# Patient Record
Sex: FEMALE | Race: WHITE | NOT HISPANIC OR LATINO | Employment: OTHER | URBAN - METROPOLITAN AREA
[De-identification: names, ages, dates, MRNs, and addresses within clinical notes are randomized per-mention and may not be internally consistent; named-entity substitution may affect disease eponyms.]

---

## 2018-05-10 ENCOUNTER — OFFICE VISIT CONVERTED (OUTPATIENT)
Dept: FAMILY MEDICINE CLINIC | Facility: CLINIC | Age: 48
End: 2018-05-10
Attending: NURSE PRACTITIONER

## 2018-06-26 ENCOUNTER — OFFICE VISIT CONVERTED (OUTPATIENT)
Dept: FAMILY MEDICINE CLINIC | Facility: CLINIC | Age: 48
End: 2018-06-26
Attending: NURSE PRACTITIONER

## 2018-06-27 ENCOUNTER — OFFICE VISIT CONVERTED (OUTPATIENT)
Dept: SURGERY | Facility: CLINIC | Age: 48
End: 2018-06-27
Attending: SURGERY

## 2020-01-02 ENCOUNTER — HOSPITAL ENCOUNTER (OUTPATIENT)
Dept: URGENT CARE | Facility: CLINIC | Age: 50
Discharge: HOME | End: 2020-01-02
Attending: EMERGENCY MEDICINE

## 2020-01-20 ENCOUNTER — OFFICE VISIT CONVERTED (OUTPATIENT)
Dept: FAMILY MEDICINE CLINIC | Facility: CLINIC | Age: 50
End: 2020-01-20
Attending: NURSE PRACTITIONER

## 2020-01-20 ENCOUNTER — CONVERSION ENCOUNTER (OUTPATIENT)
Dept: FAMILY MEDICINE CLINIC | Facility: CLINIC | Age: 50
End: 2020-01-20

## 2020-01-31 ENCOUNTER — HOSPITAL ENCOUNTER (OUTPATIENT)
Dept: FAMILY MEDICINE CLINIC | Facility: CLINIC | Age: 50
Discharge: HOME | End: 2020-01-31
Attending: NURSE PRACTITIONER

## 2020-02-10 ENCOUNTER — HOSPITAL ENCOUNTER (OUTPATIENT)
Dept: FAMILY MEDICINE CLINIC | Facility: CLINIC | Age: 50
Discharge: HOME | End: 2020-02-10
Attending: NURSE PRACTITIONER

## 2020-02-10 LAB
APPEARANCE UR: ABNORMAL
BILIRUB UR QL: NEGATIVE
C TRACH RRNA CVX QL NAA+PROBE: NOT DETECTED
COLOR UR: YELLOW
CONV BACTERIA: NEGATIVE
CONV COLLECTION SOURCE (UA): ABNORMAL
CONV UROBILINOGEN IN URINE BY AUTOMATED TEST STRIP: 0.2 {EHRLICHU}/DL (ref 0.1–1)
GLUCOSE UR QL: NEGATIVE MG/DL
HGB UR QL STRIP: NEGATIVE
KETONES UR QL STRIP: NEGATIVE MG/DL
LEUKOCYTE ESTERASE UR QL STRIP: NEGATIVE
N GONORRHOEA DNA SPEC QL NAA+PROBE: NOT DETECTED
NITRITE UR QL STRIP: NEGATIVE
PH UR STRIP.AUTO: 6.5 [PH] (ref 5–8)
PROT UR QL: NEGATIVE MG/DL
RBC #/AREA URNS HPF: ABNORMAL /[HPF]
SP GR UR: 1.01 (ref 1–1.03)
SQUAMOUS SPT QL MICRO: ABNORMAL /[HPF]
WBC #/AREA URNS HPF: ABNORMAL /[HPF]

## 2020-09-15 ENCOUNTER — HOSPITAL ENCOUNTER (OUTPATIENT)
Dept: OTHER | Facility: HOSPITAL | Age: 50
Discharge: HOME | End: 2020-09-15

## 2020-09-15 LAB
ALBUMIN SERPL-MCNC: 4.3 G/DL (ref 3.5–5)
ALBUMIN/GLOB SERPL: 1.3 {RATIO} (ref 1.4–2.6)
ALP SERPL-CCNC: 102 U/L (ref 42–98)
ALT SERPL-CCNC: 12 U/L (ref 10–40)
ANION GAP SERPL CALC-SCNC: 17 MMOL/L (ref 8–19)
AST SERPL-CCNC: 18 U/L (ref 15–50)
BASOPHILS # BLD AUTO: 0.06 10*3/UL (ref 0–0.2)
BASOPHILS NFR BLD AUTO: 0.8 % (ref 0–3)
BILIRUB SERPL-MCNC: 0.31 MG/DL (ref 0.2–1.3)
BUN SERPL-MCNC: 8 MG/DL (ref 5–25)
BUN/CREAT SERPL: 10 {RATIO} (ref 6–20)
CALCIUM SERPL-MCNC: 9.9 MG/DL (ref 8.7–10.4)
CHLORIDE SERPL-SCNC: 103 MMOL/L (ref 99–111)
CHOLEST SERPL-MCNC: 151 MG/DL (ref 107–200)
CHOLEST/HDLC SERPL: 3.2 {RATIO} (ref 3–6)
CONV ABS IMM GRAN: 0.02 10*3/UL (ref 0–0.2)
CONV CO2: 25 MMOL/L (ref 22–32)
CONV IMMATURE GRAN: 0.3 % (ref 0–1.8)
CONV TOTAL PROTEIN: 7.7 G/DL (ref 6.3–8.2)
CREAT UR-MCNC: 0.84 MG/DL (ref 0.5–0.9)
DEPRECATED RDW RBC AUTO: 42.3 FL (ref 36.4–46.3)
EOSINOPHIL # BLD AUTO: 0 % (ref 0–7)
EOSINOPHIL # BLD AUTO: 0 10*3/UL (ref 0–0.7)
ERYTHROCYTE [DISTWIDTH] IN BLOOD BY AUTOMATED COUNT: 13.5 % (ref 11.7–14.4)
EST. AVERAGE GLUCOSE BLD GHB EST-MCNC: 103 MG/DL
GFR SERPLBLD BASED ON 1.73 SQ M-ARVRAT: >60 ML/MIN/{1.73_M2}
GLOBULIN UR ELPH-MCNC: 3.4 G/DL (ref 2–3.5)
GLUCOSE SERPL-MCNC: 101 MG/DL (ref 65–99)
HBA1C MFR BLD: 5.2 % (ref 3.5–5.7)
HCT VFR BLD AUTO: 48.2 % (ref 37–47)
HDLC SERPL-MCNC: 47 MG/DL (ref 40–60)
HGB BLD-MCNC: 15.5 G/DL (ref 12–16)
LDLC SERPL CALC-MCNC: 88 MG/DL (ref 70–100)
LYMPHOCYTES # BLD AUTO: 1.36 10*3/UL (ref 1–5)
LYMPHOCYTES NFR BLD AUTO: 18.2 % (ref 20–45)
MCH RBC QN AUTO: 27.2 PG (ref 27–31)
MCHC RBC AUTO-ENTMCNC: 32.2 G/DL (ref 33–37)
MCV RBC AUTO: 84.7 FL (ref 81–99)
MONOCYTES # BLD AUTO: 0.49 10*3/UL (ref 0.2–1.2)
MONOCYTES NFR BLD AUTO: 6.6 % (ref 3–10)
NEUTROPHILS # BLD AUTO: 5.53 10*3/UL (ref 2–8)
NEUTROPHILS NFR BLD AUTO: 74.1 % (ref 30–85)
NRBC CBCN: 0 % (ref 0–0.7)
OSMOLALITY SERPL CALC.SUM OF ELEC: 290 MOSM/KG (ref 273–304)
PLATELET # BLD AUTO: 227 10*3/UL (ref 130–400)
PMV BLD AUTO: 11.7 FL (ref 9.4–12.3)
POTASSIUM SERPL-SCNC: 4.1 MMOL/L (ref 3.5–5.3)
RBC # BLD AUTO: 5.69 10*6/UL (ref 4.2–5.4)
SODIUM SERPL-SCNC: 141 MMOL/L (ref 135–147)
TRIGL SERPL-MCNC: 78 MG/DL (ref 40–150)
TSH SERPL-ACNC: 1.05 M[IU]/L (ref 0.27–4.2)
VLDLC SERPL-MCNC: 16 MG/DL (ref 5–37)
WBC # BLD AUTO: 7.46 10*3/UL (ref 4.8–10.8)

## 2020-09-16 LAB — CONV THYROXINE TOTAL: 8.6 UG/DL (ref 4.5–12)

## 2020-09-21 LAB — T3REVERSE SERPL-MCNC: 19.4 NG/DL (ref 9.2–24.1)

## 2020-10-12 ENCOUNTER — CONVERSION ENCOUNTER (OUTPATIENT)
Dept: FAMILY MEDICINE CLINIC | Facility: CLINIC | Age: 50
End: 2020-10-12

## 2020-10-12 ENCOUNTER — TELEMEDICINE CONVERTED (OUTPATIENT)
Dept: FAMILY MEDICINE CLINIC | Facility: CLINIC | Age: 50
End: 2020-10-12
Attending: NURSE PRACTITIONER

## 2020-11-09 ENCOUNTER — CONVERSION ENCOUNTER (OUTPATIENT)
Dept: GASTROENTEROLOGY | Facility: CLINIC | Age: 50
End: 2020-11-09
Attending: INTERNAL MEDICINE

## 2020-12-07 ENCOUNTER — HOSPITAL ENCOUNTER (OUTPATIENT)
Dept: URGENT CARE | Facility: CLINIC | Age: 50
Discharge: HOME | End: 2020-12-07
Attending: PHYSICIAN ASSISTANT

## 2020-12-28 ENCOUNTER — HOSPITAL ENCOUNTER (OUTPATIENT)
Dept: URGENT CARE | Facility: CLINIC | Age: 50
Discharge: HOME | End: 2020-12-28
Attending: FAMILY MEDICINE

## 2021-01-13 ENCOUNTER — CONVERSION ENCOUNTER (OUTPATIENT)
Dept: ORTHOPEDIC SURGERY | Facility: CLINIC | Age: 51
End: 2021-01-13

## 2021-01-13 ENCOUNTER — OFFICE VISIT CONVERTED (OUTPATIENT)
Dept: ORTHOPEDIC SURGERY | Facility: CLINIC | Age: 51
End: 2021-01-13
Attending: STUDENT IN AN ORGANIZED HEALTH CARE EDUCATION/TRAINING PROGRAM

## 2021-04-22 ENCOUNTER — HOSPITAL ENCOUNTER (OUTPATIENT)
Dept: VACCINE CLINIC | Facility: HOSPITAL | Age: 51
Discharge: HOME | End: 2021-04-22
Attending: INTERNAL MEDICINE

## 2021-05-11 NOTE — H&P
History and Physical      Patient Name: Sonia Lima   Patient ID: [id number]   Sex: Female   YOB: 1970    Primary Care Provider: Tova CEDENO   Referring Provider: Tova CEDENO    Visit Date: January 13, 2021    Provider: Carlos Jc MD   Location: [facility]   Location Address: [address] [place] [address]  [id number]   Location Phone: [phone]          Chief Complaint  · left ankle pain   · right big toe      History Of Present Illness  Sonia Lima is a 50 year old /White female who presents today to [facility].      Sonia presents to the office today for evaluation of her left ankle. She reports that she sustained a mechanical fall at her house with an inversion injury to her left ankle and also jammed her right foot on the date of 12/28/2020. She felt immediate pain. [facility] [place] [facility] after. X-rays were obtained and revealed a nondisplaced fracture of her right big toe. The x-ray of her left ankle results were negative for acute fractures. She was placed in a fracture boot and arlen taping over her first two toes. She presented to the [facility] [place], operated by [facility] ED one week later due to persistent pain, and repeat x rays were again negative. She follows up at the office for further evaluation. She states that her right toe is feeling fine today. She removed the arlen tape after 2 days due to feeling uncomfortable and resulting in spasms of her toes. She states that she has been wearing regular shoe wear with her right foot. She has been wearing the fracture boot for her left ankle full time, ambulating on her heel. She states that the pain is located over the anterior lateral aspect of her ankle.  She takes the boot off at night and applies an ace bandage occasionally, depending on the level of pain. She has been taking 800mg ibuprofen for pain control, tolerating it well.  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 She is taking care of her  who has a rare form of lymphoma. She is non-diabetic. She smokes half pack/day. She is otherwise overall healthy.                    Past Medical History  Broken Bones; Depression; Dupuytren's contracture of right hand; Family history of colon cancer; Hot flashes; Mood swings; Reflux; Reflux Disease; Ringing in ears; Seasonal allergies; Snoring; Tobacco Abuse, Chronic         Past Surgical History  Cesarian Section; EGD; Eye Implant; Hysterectomy; Salpingectomy; Wrist         Medication List  amitriptyline 25 mg oral tablet; omeprazole 20 mg oral capsule,delayed release(DR/EC); sertraline 100 mg oral tablet         Allergy List  PENICILLINS       Allergies Reconciled  Family Medical History  Heart Disease; Cancer, Unspecified; Diabetes, unspecified type; Family history of colon cancer; Family history of breast cancer; Family history of rectal cancer; Osteoporosis; Family history of Arthritis; Family history of uterine cancer; Family history of colonic polyps         Reproductive History   4 Para 3 0 0 0       Social History  Alcohol (Current some day); Alcohol Use (Never); lives with other; lives with parents; lives with spouse; ; .; Recreational Drug Use (Never); Tobacco (Current every day); Working         Immunizations  Name Date Admin   Influenza 10/12/2020   Influenza Refused   Tdap 2018         Review of Systems  · Constitutional  o Denies  o : fever, chills, weight loss  · Cardiovascular  o Denies  o : chest pain, shortness of breath  · Gastrointestinal  o Denies  o : liver disease, heartburn, nausea, blood in stools  · Genitourinary  o Denies  o : painful urination, blood in urine  · Integument  o Denies  o : rash, itching  · Neurologic  o Denies  o : headache, weakness, loss of consciousness  · Musculoskeletal  o Admits  o : left ankle pain, right big toe pain  · Psychiatric  o Denies  o : drug/alcohol addiction,  "anxiety, depression      Vitals  Date Time BP Position Site L\R Cuff Size HR RR TEMP (F) WT  HT  BMI kg/m2 BSA m2 O2 Sat FR L/min FiO2 HC       01/13/2021 08:13 AM      90 - R   218lbs 6oz 5'  3\" 38.68 2.1 97 %  21%          Physical Examination  · Constitutional  o Appearance  o : well developed, well-nourished, no obvious deformities present  · Head and Face  o Head  o :   § Inspection  § : normocephalic  o Face  o :   § Inspection  § : no facial lesions  · Eyes  o Conjunctivae  o : conjunctivae normal  o Sclerae  o : sclerae white  · Ears, Nose, Mouth and Throat  o Ears  o :   § External Ears  § : appearance within normal limits  § Hearing  § : intact  o Nose  o :   § External Nose  § : appearance normal  · Neck  o Inspection/Palpation  o : normal appearance  o Range of Motion  o : full range of motion  · Respiratory  o Respiratory Effort  o : breathing unlabored  o Inspection of Chest  o : normal appearance  o Auscultation of Lungs  o : no audible wheezing or rales  · Cardiovascular  o Heart  o : regular rate  · Gastrointestinal  o Abdominal Examination  o : soft and non-tender  · Skin and Subcutaneous Tissue  o General Inspection  o : intact, no rashes  · Psychiatric  o General  o : Alert and oriented x3  o Judgement and Insight  o : judgment and insight intact  o Mood and Affect  o : mood normal, affect appropriate  · Right Ankle/Foot  o Inspection  o : No swelling. No wounds. Tender over the dorsal aspect of distal phalanx of first big toe. Limited toe flexion and extension at the IP joint secondary to pain. Demonstrates active ankle dorsiflexion and plantarflexion without any deficit. Sensation to light touch intact over the dorsal and planter foot. Palpable DP pulse.  · Left Ankle/Foot  o Inspection  o : Moderate swelling over the lateral ankle with resolving bruising extending down into the toes. Sever tenderness over the anterior lateral ligaments and distal lateral malleolus. Non tender over the peroneal " tendons. Nontender over the medial malleolus. Nontender over the deltoid ligament. Nontender over the remaining midfoot, hindfoot or forefoot. No pain with syndesmotic squeeze. Nontender over the achilles tendon. Demonstrates active ankle dorsiflexion, plantarflexion, eversion and inversion limited by pain. Sensations to light touch intact over the dorsal and planter foot. Palpable DP. Wiggles toes pain-free.           Assessment  · Left ankle pain     719.47/M25.572  · Left ankle sprain     845.00/S93.409A  · Great toe fracture, right     826.0/S92.911A      Plan  · Orders  o Tobacco cessation counseling completed (3556F) - - 01/14/2021  · Medications  o Medications have been Reconciled  o Transition of Care or Provider Policy  · Instructions  o X-rays reviewed by Dr. Jc.  o Reviewed the patient's Past Medical, Social, and Family history as well as the ROS at today's visit, no changes.  o Call or return if worsening symptoms.  o Follow Up in 2 weeks.  o The above service was scribed by Jean-Claude Zaldivar on my behalf and I attest to the accuracy of the note. cb  o Sonia has a nondisplaced fracture of her right great toe distal phalanx and a left ankle sprain. We discussed treatment options. We discussed symptomatic measures for the right great toe. She is able to ambulate in normal shoes without pain at this time and may continue to do so. She will continue the fracture boot for the left ankle. We will get her started with physical therapy working on range of motion, peroneal strengthening, and proprioception. She should continue to ice, elevate, take anti-inflammatory medications, and use a compressive wrap as needed. I will see her back in 2 weeks to monitor her progress. We will transition to a ankle brace, likely at that time.            Electronically Signed by: Carlos Jc MD -Author on January 15, 2021 02:17:13 PM

## 2021-05-11 NOTE — H&P
History and Physical      Patient Name: Sonia Mendoza   Patient ID: [id number]   Sex: Female   YOB: 1970    Primary Care Provider: Tova CEDENO   Referring Provider: Tova CEDENO    Visit Date: November 9, 2020    Provider: Luis Jo MD   Location: [facility] Address: [address] [place] [address], [address] [place] [address]  [id number]   Location Phone: [phone]          Chief Complaint  · Surgical History and Physical  · Screening Colonoscopy  · Direct access/screening phone call was made to the patient to update health history and schedule endoscopy      History Of Present Illness  NON-INPATIENT HISTORY AND PHYSICAL  Allergies: PENICILLINS   Chief Complaint/History of Present Illness: Screening Colonoscopy   Colon Recall: No   Failed Outpatient Treatment/Contraindications: N/A   Current Medications: amitriptyline 25 mg oral tablet, omeprazole 20 mg oral capsule,delayed release(DR/EC), sertraline 100 mg oral tablet, and Suprep Bowel Prep Kit 17.5-3.13-1.6 gram oral recon soln   Significant Past Medical History: Broken Bones, Depression, Dupuytren's contracture of right hand, Family history of colon cancer, Hot flashes, Mood swings, Reflux Disease, Ringing in ears, Snoring, and Tobacco Abuse, Chronic   Significant Family Medical History: family history of colon cancer, family history of rectal cancer, family history of colon polyps, family history of uterine cancer   Significant Past Surgical History: EGD, Hysterectomy, Salpingectomy, and Wrist   Previous Colonoscopy: No   Previous EGD: Yes YEAR: 2003 By Whom: [place]   PHYSICAL EXAM:  Heart: Regular Rate and Rhythm   Lungs: Breathing Unlabored       Past Medical History  Disease Name Date Onset Notes   Broken Bones --  --    Depression --  --    Dupuytren's contracture of right hand 10/12/2020 --    Family history of colon cancer 10/12/2020 --    Hot flashes 07/21/2014 07/21/2014    Mood  swings --  --    Reflux Disease --  --    Ringing in ears --  --    Snoring 2014    Tobacco Abuse, Chronic 2014          Past Surgical History  Procedure Name Date Notes   EGD  [place]   Hysterectomy 2016 --    Salpingectomy 2016 --    Wrist 1993 rt gangloin cyst         Medication List  Name Date Started Instructions   amitriptyline 25 mg oral tablet  take 1 tablet (25 mg) by oral route once daily at bedtime   omeprazole 20 mg oral capsule,delayed release(DR/EC) 05/10/2018 take 1 capsule (20 mg) by oral route once daily before a meal   sertraline 100 mg oral tablet  take 1 tablet (100 mg) by oral route once daily   Suprep Bowel Prep Kit 17.5-3.13-1.6 gram oral recon soln 2020 please follow instructions per provider         Allergy List  Allergen Name Date Reaction Notes   PENICILLINS --  rash , itchy ,swelling --        Allergies Reconciled  Family Medical History  Disease Name Relative/Age Notes   Heart Disease Aunt/  Father/  Grandfather (paternal)/  Grandmother (paternal)/  Uncle/   --    Diabetes, unspecified type Brother/  Sister/   --    Family history of colon cancer Brother/   --    Family history of breast cancer Mother/   --    Family history of rectal cancer Brother/   --    Family history of uterine cancer Grandmother (maternal)/   --    Family history of colonic polyps Mother/   --          Reproductive History  Menstrual   Last Menstrual Period: 10/31/2014   Pregnancy Summary   Total Pregnancies: 4 Full Term: 3 Premature: 0   Ab Induced: 0 Ab Spontaneous: 0 Ectopics: 0   Multiples: 0 Livin         Social History  Finding Status Start/Stop Quantity Notes   Alcohol Current some day --/-- 1-2 month --     --  --/-- --  --    Tobacco Current every day --/-- 1ppd 2016 - Stopped smokeing for three weeks          Immunizations  NameDate Admin Mfg Trade Name Lot Number Route Inj VIS Given VIS Publication   Occumzinx00/2020 [employer] FLUARIX  [id number] IM  10/12/2020 08/15/2019   Comments: PT TOLERATED INJ WELL, LEFT OFFICE STABLE   Tdap06/26/2018 [employer] BOOSTRIX [id number] RD 06/26/2018 02/24/2015   Comments: TOLERATED WELL, LEFT IN STABLE CONDITION             Assessment  · Preoperative examination     V72.84/Z01.818  · Screening for colon cancer     V76.51/Z12.11    Problems Reconciled  Plan  · Orders  o Consent for Colonoscopy Screening -Possible risk/complications, benefits, and alternatives to surgical or invasive procedure have been explained to patient and/or legal gaurdian. -Patient has been evaluated and can tolerate anethesia and/or sedation. Risk, benefits, and alternatives to anesthesia and sedation have been explained to patient or legal gaurdian. () - V72.84/Z01.818, V76.51/Z12.11 - 01/08/2021  · Medications  o Medications have been Reconciled  o Transition of Care or Provider Policy  · Instructions  o ****Surgical Orders****  o ***************  o Outpatient  o ***************  o RISK AND BENEFITS:  o Possible risks/complications, benefits and alternatives to surgical or invasive procedure have been explained to the patient and/or legal guardian.  o Patient has been evaluated and can tolerate anesthesia and/or sedation. Risks, benefits, and alternatives to anesthesia and sedation have been explained to the patient and/or legal guardian.  o ***************  o PREP: Per protocol  o IV: Per Anesthesia  o The above History and Physical Examination has been completed within 30 days of admission.  o This note has been transcribed by REAL Hendricks. I have read and agree with the findings in this note.            Electronically Signed by: Mariela Hendricks MA -Author on November 9, 2020 10:01:56 AM

## 2021-05-13 ENCOUNTER — HOSPITAL ENCOUNTER (OUTPATIENT)
Dept: VACCINE CLINIC | Facility: HOSPITAL | Age: 51
Discharge: HOME | End: 2021-05-13
Attending: INTERNAL MEDICINE

## 2021-05-13 ENCOUNTER — HOSPITAL ENCOUNTER (OUTPATIENT)
Dept: MAMMOGRAPHY | Facility: HOSPITAL | Age: 51
Discharge: HOME | End: 2021-05-13
Attending: NURSE PRACTITIONER

## 2021-05-14 VITALS — WEIGHT: 218.37 LBS | HEART RATE: 90 BPM | BODY MASS INDEX: 38.69 KG/M2 | HEIGHT: 63 IN | OXYGEN SATURATION: 97 %

## 2021-05-14 VITALS
OXYGEN SATURATION: 98 % | DIASTOLIC BLOOD PRESSURE: 80 MMHG | HEIGHT: 62 IN | SYSTOLIC BLOOD PRESSURE: 128 MMHG | TEMPERATURE: 96.7 F | WEIGHT: 218.5 LBS | HEART RATE: 95 BPM | BODY MASS INDEX: 40.21 KG/M2

## 2021-05-14 NOTE — PROGRESS NOTES
"   Progress Note      Patient Name: Sonia Mendoza   Patient ID: [id number]   Sex: Female   YOB: 1970    Primary Care Provider: Tova CEDENO   Referring Provider: Tova CEDENO    Visit Date: October 12, 2020    Provider: PAO Cormier   Location: [facility]   Location Address: [address] [place] [address], [address] [place] [address]  [id number]   Location Phone: [phone]          Chief Complaint  · trigger finger pain - right hand      History Of Present Illness  Video Conferencing Visit  Sonia Mendoza is a 50 year old /White female who is presenting for evaluation via video conferencing via Zoom. Verbal consent obtained before beginning visit.   The following staff were present during this visit: Alba Smith CMA and PAO Delarosa.   Provider spent 30 minutes with patient during telehealth visit.   Past Medical History/Overview of Patient Symptoms     She is here for an acute visit today via telehealth.  She is complaining of right middle finger \"trigger finger\" walking on her and she cannot open her finger without manually pulling it upward.  She states that it is not painful but now she is having problems with her adjoining index finger swelling and tender at times.  She does have a history of right shoulder pain with limited range of motion.    She is wanting an order for colonoscopy.  She states that she has had 2 brothers diagnosed with colorectal cancer.  She states both her brothers are much older than her, 18 and 24 years older than her.    Her last mammogram was in 2014.    She is a current smoker, Smokes about 1 pack/day.  She was going to quit using Chantix back in January but states that she had some family issues come up at that time so she decided not to start that at that time.  She does follow with psychiatric care and is currently on sertraline 100 mg and amitriptyline 25 mg daily.  She states she did pick " "up the prescription for the Chantix and does have it at home to start when she is ready.       Past Medical History  Broken Bones; Depression; Hot flashes; Mood swings; Reflux Disease; Ringing in ears; Snoring; Tobacco Abuse, Chronic         Past Surgical History  Hysterectomy; Salpingectomy; Wrist         Medication List  amitriptyline 25 mg oral tablet; Chantix Continuing Month Box 1 mg oral tablet; Chantix Starting Month Box 0.5 mg (11)- 1 mg (42) oral tablets,dose pack; omeprazole 20 mg oral capsule,delayed release(DR/EC); sertraline 100 mg oral tablet         Allergy List  PENICILLINS       Allergies Reconciled  Family Medical History  Heart Disease; Diabetes, unspecified type         Reproductive History   4 Para 3 0 0 0       Social History  Alcohol (Current some day); ; Tobacco (Current every day)         Immunizations  Name Date Admin   Influenza 10/12/2020   Influenza Refused   Tdap 2018         Review of Systems  · Constitutional  o Denies  o : fever, fatigue, weight loss, weight gain  · Cardiovascular  o Denies  o : lower extremity edema, claudication, chest pressure, palpitations  · Respiratory  o Denies  o : shortness of breath, wheezing, cough, hemoptysis, dyspnea on exertion  · Gastrointestinal  o Admits  o : constipation  o Denies  o : nausea, vomiting, diarrhea, abdominal pain, blood in stools, melena  · Integument  o Denies  o : rash, itching  · Musculoskeletal  o Admits  o : limitation of motion, shoulder pain, trigger finger  o Denies  o : joint pain, joint swelling  · Psychiatric  o Denies  o : anxiety, depression, suicidal ideation, homicidal ideation      Vitals  Date Time BP Position Site L\R Cuff Size HR RR TEMP (F) WT  HT  BMI kg/m2 BSA m2 O2 Sat FR L/min FiO2 HC       10/12/2020 09:07 /80 Sitting    95 - R  96.7 218lbs 8oz 5'  2\" 39.96 2.08 98 %            Physical Examination  · Constitutional  o Appearance  o : no acute distress, well-nourished  · Head and " Face  o Head  o :   § Inspection  § : atraumatic, normocephalic  · Respiratory  o Respiratory Effort  o : breathing comfortably, symmetric chest rise  · Neurologic  o Mental Status Examination  o :   § Orientation  § : grossly oriented to person, place and time  · Psychiatric  o General  o : normal mood and affect  · Right Hand  o Inspection  o : Right middle finger locked in a flexed position          Assessment  · Need for influenza vaccination     V04.81/Z23  · Screening for colon cancer     V76.51/Z12.11  · Visit for screening mammogram     V76.12/Z12.31  · Dupuytren's contracture of right hand     728.6/M72.0  · Family history of colon cancer     V16.0/Z80.0      Plan  · Orders  o Immunization Admin Fee (Single) ([facility]) (86603) - V04.81/Z23 - 10/12/2020  o Fluarix QUADRIVALENT Vaccine, Syringe, Latex Free, Preservative Free, age 3+ (02702) - V04.81/Z23 - 10/12/2020   Vaccine - Influenza; Dose: 0.5; Site: Left Upper Arm; Route: Intramuscular; Date: 10/12/2020 09:43:00; Exp: 06/30/2021; Lot: [id number]; Mfg: [employer]; TradeName: FLUARIX; Administered By: Alba Smith CMA; Comment: PT TOLERATED INJ WELL, LEFT OFFICE STABLE  o COLONOSCOPY REFERRAL (COLON) - V76.51/Z12.11, V16.0/Z80.0 - 10/12/2020  o Screening Mammography; Bilateral 3D (68541, 02461, ) - V76.12/Z12.31 - 10/12/2020  o ACO-39: Current medications updated and reviewed (1159F, ) - - 10/12/2020  o ACO-14: Influenza immunization administered or previously received [facility] () - - 10/12/2020  o Physician Telephone Evaluation, 21-30 minutes (37592) - 728.6/M72.0, V16.0/Z80.0, V76.12/Z12.31, V76.51/Z12.11 - 10/12/2020  o Hand Surgeon/Consultation (HANDS) - 728.6/M72.0 - 10/12/2020  · Medications  o Medications have been Reconciled  o Transition of Care or Provider Policy  · Instructions  o Patient is taking medications as prescribed and doing well.   o Patient was educated/instructed on their diagnosis, treatment and medications prior to  discharge from the clinic today.  o Patient counseled to stop smoking.  o Call the office with any concerns or questions.  o Plan Of Care:   · Disposition  o Return to clinic in 6 months            Electronically Signed by: PAO Cormier -Author on October 12, 2020 11:11:27 AM

## 2021-05-15 VITALS
TEMPERATURE: 98.8 F | BODY MASS INDEX: 38.41 KG/M2 | HEIGHT: 64 IN | OXYGEN SATURATION: 98 % | HEART RATE: 102 BPM | DIASTOLIC BLOOD PRESSURE: 64 MMHG | SYSTOLIC BLOOD PRESSURE: 108 MMHG | WEIGHT: 225 LBS

## 2021-05-16 VITALS
DIASTOLIC BLOOD PRESSURE: 74 MMHG | OXYGEN SATURATION: 97 % | RESPIRATION RATE: 16 BRPM | HEIGHT: 64 IN | SYSTOLIC BLOOD PRESSURE: 122 MMHG | WEIGHT: 209 LBS | BODY MASS INDEX: 35.68 KG/M2 | HEART RATE: 94 BPM | TEMPERATURE: 98.9 F

## 2021-05-16 VITALS — HEIGHT: 64 IN | RESPIRATION RATE: 16 BRPM | BODY MASS INDEX: 35.68 KG/M2 | WEIGHT: 209 LBS

## 2021-05-16 VITALS
HEIGHT: 64 IN | WEIGHT: 214.12 LBS | SYSTOLIC BLOOD PRESSURE: 132 MMHG | TEMPERATURE: 98.7 F | DIASTOLIC BLOOD PRESSURE: 82 MMHG | HEART RATE: 95 BPM | OXYGEN SATURATION: 97 % | BODY MASS INDEX: 36.55 KG/M2

## 2021-05-18 ENCOUNTER — HOSPITAL ENCOUNTER (OUTPATIENT)
Dept: URGENT CARE | Facility: CLINIC | Age: 51
Discharge: HOME | End: 2021-05-18
Attending: EMERGENCY MEDICINE

## 2021-05-19 LAB — SARS-COV-2 RNA SPEC QL NAA+PROBE: NOT DETECTED

## 2021-08-28 ENCOUNTER — HOSPITAL ENCOUNTER (EMERGENCY)
Facility: HOSPITAL | Age: 51
Discharge: HOME | End: 2021-08-28
Attending: EMERGENCY MEDICINE | Admitting: EMERGENCY MEDICINE

## 2021-08-28 ENCOUNTER — APPOINTMENT (OUTPATIENT)
Dept: CT IMAGING | Facility: HOSPITAL | Age: 51
End: 2021-08-28

## 2021-08-28 VITALS
SYSTOLIC BLOOD PRESSURE: 109 MMHG | WEIGHT: 216.27 LBS | HEIGHT: 62 IN | RESPIRATION RATE: 18 BRPM | TEMPERATURE: 98.9 F | OXYGEN SATURATION: 97 % | BODY MASS INDEX: 39.8 KG/M2 | HEART RATE: 83 BPM | DIASTOLIC BLOOD PRESSURE: 72 MMHG

## 2021-08-28 DIAGNOSIS — H81.12 VERTIGO, BENIGN PAROXYSMAL, LEFT: Primary | ICD-10-CM

## 2021-08-28 LAB
ALBUMIN SERPL-MCNC: 4.4 G/DL (ref 3.5–5.2)
ALBUMIN/GLOB SERPL: 1.3 G/DL
ALP SERPL-CCNC: 105 U/L (ref 39–117)
ALT SERPL W P-5'-P-CCNC: 9 U/L (ref 1–33)
ANION GAP SERPL CALCULATED.3IONS-SCNC: 8.9 MMOL/L (ref 5–15)
AST SERPL-CCNC: 16 U/L (ref 1–32)
BASOPHILS # BLD AUTO: 0.06 10*3/MM3 (ref 0–0.2)
BASOPHILS NFR BLD AUTO: 0.6 % (ref 0–1.5)
BILIRUB SERPL-MCNC: 0.2 MG/DL (ref 0–1.2)
BUN SERPL-MCNC: 10 MG/DL (ref 6–20)
BUN/CREAT SERPL: 14.3 (ref 7–25)
CALCIUM SPEC-SCNC: 9.6 MG/DL (ref 8.6–10.5)
CHLORIDE SERPL-SCNC: 102 MMOL/L (ref 98–107)
CO2 SERPL-SCNC: 26.1 MMOL/L (ref 22–29)
CREAT SERPL-MCNC: 0.7 MG/DL (ref 0.57–1)
DEPRECATED RDW RBC AUTO: 41.6 FL (ref 37–54)
EOSINOPHIL # BLD AUTO: 0 10*3/MM3 (ref 0–0.4)
EOSINOPHIL NFR BLD AUTO: 0 % (ref 0.3–6.2)
ERYTHROCYTE [DISTWIDTH] IN BLOOD BY AUTOMATED COUNT: 13.5 % (ref 12.3–15.4)
GFR SERPL CREATININE-BSD FRML MDRD: 88 ML/MIN/1.73
GLOBULIN UR ELPH-MCNC: 3.4 GM/DL
GLUCOSE BLDC GLUCOMTR-MCNC: 92 MG/DL (ref 70–99)
GLUCOSE SERPL-MCNC: 102 MG/DL (ref 65–99)
HCT VFR BLD AUTO: 46.3 % (ref 34–46.6)
HGB BLD-MCNC: 15 G/DL (ref 12–15.9)
IMM GRANULOCYTES # BLD AUTO: 0.02 10*3/MM3 (ref 0–0.05)
IMM GRANULOCYTES NFR BLD AUTO: 0.2 % (ref 0–0.5)
LYMPHOCYTES # BLD AUTO: 2.04 10*3/MM3 (ref 0.7–3.1)
LYMPHOCYTES NFR BLD AUTO: 21.9 % (ref 19.6–45.3)
MCH RBC QN AUTO: 27.5 PG (ref 26.6–33)
MCHC RBC AUTO-ENTMCNC: 32.4 G/DL (ref 31.5–35.7)
MCV RBC AUTO: 84.8 FL (ref 79–97)
MONOCYTES # BLD AUTO: 0.72 10*3/MM3 (ref 0.1–0.9)
MONOCYTES NFR BLD AUTO: 7.7 % (ref 5–12)
NEUTROPHILS NFR BLD AUTO: 6.49 10*3/MM3 (ref 1.7–7)
NEUTROPHILS NFR BLD AUTO: 69.6 % (ref 42.7–76)
NRBC BLD AUTO-RTO: 0 /100 WBC (ref 0–0.2)
PLATELET # BLD AUTO: 229 10*3/MM3 (ref 140–450)
PMV BLD AUTO: 11.4 FL (ref 6–12)
POTASSIUM SERPL-SCNC: 4.2 MMOL/L (ref 3.5–5.2)
PROT SERPL-MCNC: 7.8 G/DL (ref 6–8.5)
RBC # BLD AUTO: 5.46 10*6/MM3 (ref 3.77–5.28)
SODIUM SERPL-SCNC: 137 MMOL/L (ref 136–145)
TROPONIN T SERPL-MCNC: <0.01 NG/ML (ref 0–0.03)
WBC # BLD AUTO: 9.33 10*3/MM3 (ref 3.4–10.8)

## 2021-08-28 PROCEDURE — 80053 COMPREHEN METABOLIC PANEL: CPT | Performed by: PHYSICIAN ASSISTANT

## 2021-08-28 PROCEDURE — 82962 GLUCOSE BLOOD TEST: CPT

## 2021-08-28 PROCEDURE — 99283 EMERGENCY DEPT VISIT LOW MDM: CPT

## 2021-08-28 PROCEDURE — 85025 COMPLETE CBC W/AUTO DIFF WBC: CPT | Performed by: PHYSICIAN ASSISTANT

## 2021-08-28 PROCEDURE — 93005 ELECTROCARDIOGRAM TRACING: CPT | Performed by: PHYSICIAN ASSISTANT

## 2021-08-28 PROCEDURE — 36415 COLL VENOUS BLD VENIPUNCTURE: CPT | Performed by: PHYSICIAN ASSISTANT

## 2021-08-28 PROCEDURE — 70450 CT HEAD/BRAIN W/O DYE: CPT

## 2021-08-28 PROCEDURE — 84484 ASSAY OF TROPONIN QUANT: CPT | Performed by: PHYSICIAN ASSISTANT

## 2021-08-28 RX ORDER — AMITRIPTYLINE HYDROCHLORIDE 25 MG/1
TABLET, FILM COATED ORAL
COMMUNITY
End: 2021-10-20

## 2021-08-28 RX ORDER — SERTRALINE HYDROCHLORIDE 100 MG/1
TABLET, FILM COATED ORAL
COMMUNITY
End: 2021-10-20

## 2021-08-28 RX ORDER — MECLIZINE HYDROCHLORIDE 25 MG/1
25 TABLET ORAL ONCE
Status: COMPLETED | OUTPATIENT
Start: 2021-08-28 | End: 2021-08-28

## 2021-08-28 RX ORDER — MECLIZINE HYDROCHLORIDE 25 MG/1
25 TABLET ORAL 3 TIMES DAILY PRN
Qty: 30 TABLET | Refills: 0 | Status: SHIPPED | OUTPATIENT
Start: 2021-08-28 | End: 2022-12-05

## 2021-08-28 RX ORDER — LANSOPRAZOLE 30 MG/1
CAPSULE, DELAYED RELEASE ORAL
COMMUNITY
End: 2021-10-20

## 2021-08-28 RX ADMIN — MECLIZINE HYDROCHLORIDE 25 MG: 25 TABLET ORAL at 17:02

## 2021-08-29 LAB — QT INTERVAL: 362 MS

## 2021-10-20 ENCOUNTER — OFFICE VISIT (OUTPATIENT)
Dept: FAMILY MEDICINE CLINIC | Facility: CLINIC | Age: 51
End: 2021-10-20

## 2021-10-20 VITALS
SYSTOLIC BLOOD PRESSURE: 112 MMHG | DIASTOLIC BLOOD PRESSURE: 78 MMHG | OXYGEN SATURATION: 98 % | HEART RATE: 88 BPM | TEMPERATURE: 98.8 F | BODY MASS INDEX: 40.3 KG/M2 | WEIGHT: 219 LBS | HEIGHT: 62 IN

## 2021-10-20 DIAGNOSIS — E66.01 CLASS 3 SEVERE OBESITY DUE TO EXCESS CALORIES WITHOUT SERIOUS COMORBIDITY WITH BODY MASS INDEX (BMI) OF 40.0 TO 44.9 IN ADULT (HCC): ICD-10-CM

## 2021-10-20 DIAGNOSIS — H81.10 BENIGN PAROXYSMAL POSITIONAL VERTIGO, UNSPECIFIED LATERALITY: Primary | ICD-10-CM

## 2021-10-20 PROBLEM — K21.9 ESOPHAGEAL REFLUX: Status: ACTIVE | Noted: 2021-10-20

## 2021-10-20 PROBLEM — M72.0 DUPUYTREN'S CONTRACTURE OF RIGHT HAND: Status: ACTIVE | Noted: 2020-10-12

## 2021-10-20 PROBLEM — F32.A DEPRESSION: Status: ACTIVE | Noted: 2021-10-20

## 2021-10-20 PROBLEM — J30.2 SEASONAL ALLERGIC RHINITIS: Status: ACTIVE | Noted: 2021-10-20

## 2021-10-20 PROBLEM — E66.813 CLASS 3 SEVERE OBESITY DUE TO EXCESS CALORIES WITHOUT SERIOUS COMORBIDITY WITH BODY MASS INDEX (BMI) OF 40.0 TO 44.9 IN ADULT: Status: ACTIVE | Noted: 2021-10-20

## 2021-10-20 PROBLEM — R45.86 MOOD SWINGS: Status: ACTIVE | Noted: 2021-10-20

## 2021-10-20 PROBLEM — H93.19 RINGING IN EARS: Status: ACTIVE | Noted: 2021-10-20

## 2021-10-20 PROBLEM — Z80.0 FAMILY HISTORY OF COLON CANCER: Status: ACTIVE | Noted: 2020-10-12

## 2021-10-20 PROCEDURE — 99213 OFFICE O/P EST LOW 20 MIN: CPT | Performed by: NURSE PRACTITIONER

## 2021-10-20 PROCEDURE — 90686 IIV4 VACC NO PRSV 0.5 ML IM: CPT | Performed by: NURSE PRACTITIONER

## 2021-10-20 PROCEDURE — 90471 IMMUNIZATION ADMIN: CPT | Performed by: NURSE PRACTITIONER

## 2021-10-20 NOTE — PATIENT INSTRUCTIONS
Benign Positional Vertigo  Vertigo is the feeling that you or your surroundings are moving when they are not. Benign positional vertigo is the most common form of vertigo. This is usually a harmless condition (benign). This condition is positional. This means that symptoms are triggered by certain movements and positions.  This condition can be dangerous if it occurs while you are doing something that could cause harm to you or others. This includes activities such as driving or operating machinery.  What are the causes?  The inner ear has fluid-filled canals that help your brain sense movement and balance. When the fluid moves, the brain receives messages about your body's position.  With benign positional vertigo, crystals in the inner ear break free and disturb the inner ear area. This causes your brain to receive confusing messages about your body's position.  What increases the risk?  You are more likely to develop this condition if:  · You are a woman.  · You are 50 years of age or older.  · You have recently had a head injury.  · You have an inner ear disease.  What are the signs or symptoms?  Symptoms of this condition usually happen when you move your head or your eyes in different directions. Symptoms may start suddenly, and usually last for less than a minute. They include:  · Loss of balance and falling.  · Feeling like you are spinning or moving.  · Feeling like your surroundings are spinning or moving.  · Nausea and vomiting.  · Blurred vision.  · Dizziness.  · Involuntary eye movement (nystagmus).  Symptoms can be mild and cause only minor problems, or they can be severe and interfere with daily life. Episodes of benign positional vertigo may return (recur) over time. Symptoms may improve over time.  How is this diagnosed?  This condition may be diagnosed based on:  · Your medical history.  · Physical exam of the head, neck, and ears.  · Positional tests to check for or stimulate vertigo. You may be  asked to turn your head and change positions, such as going from sitting to lying down. A health care provider will watch for symptoms of vertigo.  You may be referred to a health care provider who specializes in ear, nose, and throat problems (ENT, or otolaryngologist) or a provider who specializes in disorders of the nervous system (neurologist).  How is this treated?    This condition may be treated in a session in which your health care provider moves your head in specific positions to help the displaced crystals in your inner ear move. Treatment for this condition may take several sessions. Surgery may be needed in severe cases, but this is rare.  In some cases, benign positional vertigo may resolve on its own in 2-4 weeks.  Follow these instructions at home:  Safety  · Move slowly. Avoid sudden body or head movements or certain positions, as told by your health care provider.  · Avoid driving until your health care provider says it is safe for you to do so.  · Avoid operating heavy machinery until your health care provider says it is safe for you to do so.  · Avoid doing any tasks that would be dangerous to you or others if vertigo occurs.  · If you have trouble walking or keeping your balance, try using a cane for stability. If you feel dizzy or unstable, sit down right away.  · Return to your normal activities as told by your health care provider. Ask your health care provider what activities are safe for you.  General instructions  · Take over-the-counter and prescription medicines only as told by your health care provider.  · Drink enough fluid to keep your urine pale yellow.  · Keep all follow-up visits as told by your health care provider. This is important.  Contact a health care provider if:  · You have a fever.  · Your condition gets worse or you develop new symptoms.  · Your family or friends notice any behavioral changes.  · You have nausea or vomiting that gets worse.  · You have numbness or a  prickling and tingling sensation.  Get help right away if you:  · Have difficulty speaking or moving.  · Are always dizzy.  · Faint.  · Develop severe headaches.  · Have weakness in your legs or arms.  · Have changes in your hearing or vision.  · Develop a stiff neck.  · Develop sensitivity to light.  Summary  · Vertigo is the feeling that you or your surroundings are moving when they are not. Benign positional vertigo is the most common form of vertigo.  · This condition is caused by crystals in the inner ear that become displaced. This causes a disturbance in an area of the inner ear that helps your brain sense movement and balance.  · Symptoms include loss of balance and falling, feeling that you or your surroundings are moving, nausea and vomiting, and blurred vision.  · This condition can be diagnosed based on symptoms, a physical exam, and positional tests.  · Follow safety instructions as told by your health care provider. You will also be told when to contact your health care provider in case of problems.  This information is not intended to replace advice given to you by your health care provider. Make sure you discuss any questions you have with your health care provider.  Document Revised: 11/10/2020 Document Reviewed: 05/29/2019  Elsevier Patient Education © 2021 [employer]

## 2021-10-20 NOTE — ASSESSMENT & PLAN NOTE
I will send her to physical therapy for vestibular evaluation and treatment.  Advised that if she starts feeling better before scheduled she can cancel the appointment.

## 2021-10-20 NOTE — PROGRESS NOTES
"Chief Complaint  Dizziness and right lower back pain    Subjective          Sonia Lima presents to [facility]  History of Present Illness  She presents today with complaints of vertigo.  She states had her first episode a few months ago when she went to the emergency room at [facility] on 8/28/2021.  She had work-up done which included a CT of the head which was normal.  Lab work-up was normal.  EKG showed sinus rhythm without any abnormalities.  She states that they gave her meclizine which did not help.  She states that she started feeling better but then the dizziness returned about 5 days ago.  She states that it is worse when she lies on her left side.  She states she also had a headache with nausea that happened after getting the dizziness.  She states that she had the headache for about 4 days and then it improved.  She states she does have a history of migraines years ago.  She states she does not have a headache now but she does still have some dizziness.    Obesity: Her BMI is greater than 40.  She states that she has been trying to eat healthy and she has been doing some exercise using the [employer] fit when she is not dizzy.  She states she is having difficulty losing weight.  She did just quit smoking a few weeks ago.  Objective   Vital Signs:   /78   Pulse 88   Temp 98.8 °F (37.1 °C)   Ht 157.5 cm (62\")   Wt 99.3 kg (219 lb)   SpO2 98%   BMI 40.06 kg/m²     Physical Exam  Vitals reviewed.   Constitutional:       Appearance: Normal appearance. She is well-developed.   HENT:      Right Ear: Hearing, tympanic membrane, ear canal and external ear normal.      Left Ear: Hearing, tympanic membrane, ear canal and external ear normal.      Mouth/Throat:      Pharynx: Oropharynx is clear. No pharyngeal swelling, oropharyngeal exudate or posterior oropharyngeal erythema.   Neck:      Thyroid: No thyroid mass, thyromegaly or thyroid tenderness. "   Cardiovascular:      Rate and Rhythm: Normal rate and regular rhythm.      Heart sounds: No murmur heard.  No friction rub. No gallop.    Pulmonary:      Effort: Pulmonary effort is normal.      Breath sounds: Normal breath sounds. No wheezing or rhonchi.   Lymphadenopathy:      Cervical: No cervical adenopathy.   Skin:     General: Skin is warm and dry.   Neurological:      Mental Status: She is alert and oriented to person, place, and time.      Cranial Nerves: No cranial nerve deficit.   Psychiatric:         Mood and Affect: Mood and affect normal.         Behavior: Behavior normal.         Thought Content: Thought content normal. Thought content does not include homicidal or suicidal ideation.         Judgment: Judgment normal.        Result Review :                 Assessment and Plan    Diagnoses and all orders for this visit:    1. Benign paroxysmal positional vertigo, unspecified laterality (Primary)  Assessment & Plan:  I will send her to physical therapy for vestibular evaluation and treatment.  Advised that if she starts feeling better before scheduled she can cancel the appointment.    Orders:  -     Ambulatory Referral to Physical Therapy Evaluate and treat, Vestibular    2. Class 3 severe obesity due to excess calories without serious comorbidity with body mass index (BMI) of 40.0 to 44.9 in adult (HCC)  Assessment & Plan:  Patient's (Body mass index is 40.06 kg/m².) indicates that they are morbidly obese (BMI > 40 or > 35 with obesity - related health condition) with health conditions that include none . Weight is unchanged. BMI is is above average; BMI management plan is completed. We discussed portion control, increasing exercise and joining a fitness center or start home based exercise program.       Other orders  -     FluLaval/Fluarix/Fluzone >6 Months ([id number])      Follow Up   Return if symptoms worsen or fail to improve.  Patient was given instructions and counseling regarding her  condition or for health maintenance advice. Please see specific information pulled into the AVS if appropriate.

## 2021-10-20 NOTE — ASSESSMENT & PLAN NOTE
Patient's (Body mass index is 40.06 kg/m².) indicates that they are morbidly obese (BMI > 40 or > 35 with obesity - related health condition) with health conditions that include none . Weight is unchanged. BMI is is above average; BMI management plan is completed. We discussed portion control, increasing exercise and joining a fitness center or start home based exercise program.

## 2022-07-19 ENCOUNTER — OFFICE VISIT (OUTPATIENT)
Dept: FAMILY MEDICINE CLINIC | Facility: CLINIC | Age: 52
End: 2022-07-19

## 2022-07-19 VITALS
OXYGEN SATURATION: 99 % | WEIGHT: 211.8 LBS | SYSTOLIC BLOOD PRESSURE: 130 MMHG | HEART RATE: 85 BPM | DIASTOLIC BLOOD PRESSURE: 88 MMHG | HEIGHT: 62 IN | TEMPERATURE: 98.7 F | BODY MASS INDEX: 38.98 KG/M2

## 2022-07-19 DIAGNOSIS — M72.2 PLANTAR FASCIITIS OF LEFT FOOT: ICD-10-CM

## 2022-07-19 DIAGNOSIS — M65.331 TRIGGER MIDDLE FINGER OF RIGHT HAND: Primary | ICD-10-CM

## 2022-07-19 DIAGNOSIS — F43.21 GRIEF: ICD-10-CM

## 2022-07-19 PROCEDURE — 99213 OFFICE O/P EST LOW 20 MIN: CPT | Performed by: NURSE PRACTITIONER

## 2022-07-19 NOTE — PROGRESS NOTES
"Chief Complaint  Hand Pain (Trigger finger ), Wrist Pain, Foot Pain (Left foot ), and Leg Pain (Bilateral leg pain )    Subjective        Sonia Lima presents to [facility]  History of Present Illness  Presents today for an acute visit for trigger finger of the right hand middle finger.  She has been experiencing trigger finger over the past year.  She is previously seen [facility] and Shannan.  2 previous injections.  Last injection did not help.  Last seen them approximately in January.    Also reports over the past 1-1/2 months she has been having left heel pain.  Pain is worse in the morning with first steps.  Pain radiates down foot and up the back of her heel to her ankle.  She has done some massaging and rolling her foot on a frozen bottle.    Recently lost her  due to cancer couple months ago.  Patient was tearful during office visit.    Objective   Vital Signs:  /88   Pulse 85   Temp 98.7 °F (37.1 °C)   Ht 157.5 cm (62\")   Wt 96.1 kg (211 lb 12.8 oz)   SpO2 99%   BMI 38.74 kg/m²   Estimated body mass index is 38.74 kg/m² as calculated from the following:    Height as of this encounter: 157.5 cm (62\").    Weight as of this encounter: 96.1 kg (211 lb 12.8 oz).          Physical Exam  Vitals reviewed.   Constitutional:       Appearance: Normal appearance. She is well-developed.   HENT:      Head: Normocephalic and atraumatic.      Right Ear: External ear normal.      Left Ear: External ear normal.      Mouth/Throat:      Pharynx: No oropharyngeal exudate.   Eyes:      Conjunctiva/sclera: Conjunctivae normal.      Pupils: Pupils are equal, round, and reactive to light.   Cardiovascular:      Rate and Rhythm: Normal rate and regular rhythm.      Heart sounds: No murmur heard.    No friction rub. No gallop.   Pulmonary:      Effort: Pulmonary effort is normal.      Breath sounds: Normal breath sounds. No wheezing or rhonchi.   Abdominal:      General: " Bowel sounds are normal. There is no distension.      Palpations: Abdomen is soft.      Tenderness: There is no abdominal tenderness.   Musculoskeletal:      Right hand: Tenderness present.      Left hand: No tenderness.      Left foot: Tenderness present.      Comments: Finger catches, nodule in palm below middle finger, tenderness to the plantar fascia   Skin:     General: Skin is warm and dry.   Neurological:      Mental Status: She is alert and oriented to person, place, and time.      Cranial Nerves: No cranial nerve deficit.   Psychiatric:         Mood and Affect: Affect normal.        Result Review :                Assessment and Plan   Diagnoses and all orders for this visit:    1. Trigger middle finger of right hand (Primary)  -     Ambulatory Referral to Hand Surgery    2. Plantar fasciitis of left foot    3. Grief    take aleve 2 tablets twice daily for 2 weeks. Follow up with Jaycee for trigger finger. Provided a list of exercises and stretching. If symptoms do not improve, follow up in office.          Follow Up   No follow-ups on file.  Patient was given instructions and counseling regarding her condition or for health maintenance advice. Please see specific information pulled into the AVS if appropriate.

## 2022-12-01 NOTE — PROGRESS NOTES
GYN new patient    CC: Vaginal itching    Tobacco/Nicotine use:  No    HPI:   52 y.o. Contraception or HRT: Post menopausal, using no HRT, s/p HYST, still has one or both ovaries, benign indications  Patient has concerns today      History: PMHx, Meds, Allergies, PSHx, Social Hx, and POBHx all reviewed and updated.  PCP:Hamida Urbina MD      Review of Systems     /74   Pulse 94   Wt 101 kg (222 lb)   Breastfeeding No   BMI 40.60 kg/m²     Physical Exam  Vitals and nursing note reviewed. Exam conducted with a chaperone present.   Genitourinary:     Exam position: Lithotomy position.      Labia:         Right: Rash ( Mild erythema with skin thickening and healed excoriation) present.         Left: Rash ( Mild erythema with skin thickening and healed excoriations) present.       Urethra: No prolapse or urethral lesion.      Vagina: No prolapsed vaginal walls ( Moderate vaginal atrophy).             ASSESSMENT AND PLAN:  Problem Visit    Diagnoses and all orders for this visit:    1. Genitourinary syndrome of menopause (Primary)  Assessment & Plan:  Patient with significant vulvovaginal itching for the last 18 to 24 months  States she has been checked for infections multiple times all of which were negative  She thinks she was told she had some adhesions in the past and was treated with a cream but has not had any improvement  Her exam today has moderate vulvovaginal atrophy with erythema of the vestibule as well as some chronic vulvar irritation to bilateral labia majora  Counseled regarding the risks benefits of localized vaginal estrogen    Orders:  -     estradiol (ESTRACE VAGINAL) 0.1 MG/GM vaginal cream; Place 1gm PV and massage 1gm to vulva and vestibule at night daily for 2 weeks then start second prescription for maintenance estradiol cream therapy  Dispense: 42.5 g; Refill: 0  -     estradiol (ESTRACE VAGINAL) 0.1 MG/GM vaginal cream; Place 1gm PV and massage 1gm to vulva and vestibule at  night 2x/week  Dispense: 42.5 g; Refill: 3                Follow Up:  Return in about 6 weeks (around 1/16/2023).        Sushila Clemens MD  12/05/2022

## 2022-12-05 ENCOUNTER — OFFICE VISIT (OUTPATIENT)
Dept: OBSTETRICS AND GYNECOLOGY | Facility: CLINIC | Age: 52
End: 2022-12-05

## 2022-12-05 VITALS
SYSTOLIC BLOOD PRESSURE: 109 MMHG | WEIGHT: 222 LBS | DIASTOLIC BLOOD PRESSURE: 74 MMHG | HEART RATE: 94 BPM | BODY MASS INDEX: 40.6 KG/M2

## 2022-12-05 DIAGNOSIS — N95.8 GENITOURINARY SYNDROME OF MENOPAUSE: Primary | ICD-10-CM

## 2022-12-05 PROCEDURE — 99203 OFFICE O/P NEW LOW 30 MIN: CPT | Performed by: OBSTETRICS & GYNECOLOGY

## 2022-12-05 RX ORDER — TRAMADOL HYDROCHLORIDE 50 MG/1
TABLET ORAL
COMMUNITY
Start: 2022-09-09 | End: 2022-12-05

## 2022-12-05 RX ORDER — ESTRADIOL 0.1 MG/G
CREAM VAGINAL
Qty: 42.5 G | Refills: 0 | Status: SHIPPED | OUTPATIENT
Start: 2022-12-05 | End: 2023-01-17

## 2022-12-05 RX ORDER — ESTRADIOL 0.1 MG/G
CREAM VAGINAL
Qty: 42.5 G | Refills: 3 | Status: SHIPPED | OUTPATIENT
Start: 2022-12-05

## 2022-12-05 RX ORDER — IBUPROFEN 600 MG/1
TABLET ORAL
COMMUNITY
Start: 2022-09-09 | End: 2023-01-17

## 2022-12-05 NOTE — ASSESSMENT & PLAN NOTE
Patient with significant vulvovaginal itching for the last 18 to 24 months  States she has been checked for infections multiple times all of which were negative  She thinks she was told she had some adhesions in the past and was treated with a cream but has not had any improvement  Her exam today has moderate vulvovaginal atrophy with erythema of the vestibule as well as some chronic vulvar irritation to bilateral labia majora  Counseled regarding the risks benefits of localized vaginal estrogen

## 2023-01-16 NOTE — PROGRESS NOTES
GYN Visit    CC: lichen sclerosis    HPI:   52 y.o. Contraception or HRT: Post menopausal, using no HRT, s/p HYST, still has one or both ovaries, benign indications    Pt has concerns she would like to discuss.    Patient states overall her symptoms are improved but she has concerns that there is a knot on the right side that has developed on the right labia majora.      History: PMHx, Meds, Allergies, PSHx, Social Hx, and POBHx all reviewed and updated.    PHYSICAL EXAM:  /74   Pulse 79   Wt 100 kg (221 lb)   Breastfeeding No   BMI 40.42 kg/m²   General- NAD, alert and oriented, appropriate  Psych- Normal mood, good memory  Ext- No edema, no cyanosis    Skin- No lesions, no rashes, no acanthosis nigricans     -Lichen sclerosis resolving.  Small 1 x 2cm area of white,thickened skin at the inferior lateral aspect of the labia majora.      ASSESSMENT AND PLAN:  Diagnoses and all orders for this visit:    1. Genitourinary syndrome of menopause (Primary)  Assessment & Plan:  Symptoms have resolved except for focal area on right labia majora      2. Lichen simplex chronicus  Assessment & Plan:  Affecting right labia majora, inferior lateral aspect of labia approximately 1 x 2 cm  Will change betamethasone to qhs x 30 d and then switch to maintenance    Orders:  -     Discontinue: clobetasol (TEMOVATE) 0.05 % ointment; Apply 1 application topically to the appropriate area as directed 2 (Two) Times a Day.  Dispense: 15 g; Refill: 0  -     clobetasol (TEMOVATE) 0.05 % ointment; Apply 1 application topically to the appropriate area as directed every night at bedtime for 30 days.  Dispense: 30 g; Refill: 0              Follow Up:  Return in about 6 weeks (around 2023).          Sushila Clemens MD  2023

## 2023-01-17 ENCOUNTER — OFFICE VISIT (OUTPATIENT)
Dept: OBSTETRICS AND GYNECOLOGY | Facility: CLINIC | Age: 53
End: 2023-01-17
Payer: MEDICAID

## 2023-01-17 VITALS
HEART RATE: 79 BPM | SYSTOLIC BLOOD PRESSURE: 108 MMHG | DIASTOLIC BLOOD PRESSURE: 74 MMHG | BODY MASS INDEX: 40.42 KG/M2 | WEIGHT: 221 LBS

## 2023-01-17 DIAGNOSIS — N95.8 GENITOURINARY SYNDROME OF MENOPAUSE: Primary | ICD-10-CM

## 2023-01-17 DIAGNOSIS — L28.0 LICHEN SIMPLEX CHRONICUS: ICD-10-CM

## 2023-01-17 PROCEDURE — 99213 OFFICE O/P EST LOW 20 MIN: CPT | Performed by: OBSTETRICS & GYNECOLOGY

## 2023-01-17 RX ORDER — CLOBETASOL PROPIONATE 0.5 MG/G
1 OINTMENT TOPICAL
Qty: 30 G | Refills: 0 | Status: SHIPPED | OUTPATIENT
Start: 2023-01-17 | End: 2023-02-16

## 2023-01-17 RX ORDER — CLOBETASOL PROPIONATE 0.5 MG/G
1 OINTMENT TOPICAL 2 TIMES DAILY
Qty: 15 G | Refills: 0 | Status: SHIPPED | OUTPATIENT
Start: 2023-01-17 | End: 2023-01-17

## 2023-01-17 NOTE — ASSESSMENT & PLAN NOTE
Affecting right labia majora, inferior lateral aspect of labia approximately 1 x 2 cm  Will change betamethasone to qhs x 30 d and then switch to maintenance

## 2023-04-03 ENCOUNTER — OFFICE VISIT (OUTPATIENT)
Dept: FAMILY MEDICINE CLINIC | Facility: CLINIC | Age: 53
End: 2023-04-03
Payer: MEDICAID

## 2023-04-03 VITALS
HEIGHT: 62 IN | SYSTOLIC BLOOD PRESSURE: 118 MMHG | TEMPERATURE: 98.3 F | WEIGHT: 227.4 LBS | BODY MASS INDEX: 41.85 KG/M2 | DIASTOLIC BLOOD PRESSURE: 78 MMHG | HEART RATE: 90 BPM

## 2023-04-03 DIAGNOSIS — N76.1 CHRONIC VAGINITIS: Primary | ICD-10-CM

## 2023-04-03 DIAGNOSIS — R73.9 HYPERGLYCEMIA: ICD-10-CM

## 2023-04-03 DIAGNOSIS — R79.89 ABNORMAL CBC: ICD-10-CM

## 2023-04-03 DIAGNOSIS — B37.2 CUTANEOUS CANDIDIASIS: ICD-10-CM

## 2023-04-03 LAB
ALBUMIN SERPL-MCNC: 4.6 G/DL (ref 3.5–5.2)
ALBUMIN/GLOB SERPL: 1.4 G/DL
ALP SERPL-CCNC: 96 U/L (ref 39–117)
ALT SERPL W P-5'-P-CCNC: 10 U/L (ref 1–33)
ANION GAP SERPL CALCULATED.3IONS-SCNC: 10.9 MMOL/L (ref 5–15)
AST SERPL-CCNC: 16 U/L (ref 1–32)
BASOPHILS # BLD AUTO: 0.05 10*3/MM3 (ref 0–0.2)
BASOPHILS NFR BLD AUTO: 0.6 % (ref 0–1.5)
BILIRUB SERPL-MCNC: 0.3 MG/DL (ref 0–1.2)
BUN SERPL-MCNC: 10 MG/DL (ref 6–20)
BUN/CREAT SERPL: 14.5 (ref 7–25)
C TRACH RRNA CVX QL NAA+PROBE: NOT DETECTED
CALCIUM SPEC-SCNC: 10.3 MG/DL (ref 8.6–10.5)
CANDIDA SPECIES: NEGATIVE
CHLORIDE SERPL-SCNC: 103 MMOL/L (ref 98–107)
CO2 SERPL-SCNC: 27.1 MMOL/L (ref 22–29)
CREAT SERPL-MCNC: 0.69 MG/DL (ref 0.57–1)
DEPRECATED RDW RBC AUTO: 40.4 FL (ref 37–54)
EGFRCR SERPLBLD CKD-EPI 2021: 104.6 ML/MIN/1.73
EOSINOPHIL # BLD AUTO: 0.02 10*3/MM3 (ref 0–0.4)
EOSINOPHIL NFR BLD AUTO: 0.2 % (ref 0.3–6.2)
ERYTHROCYTE [DISTWIDTH] IN BLOOD BY AUTOMATED COUNT: 13.5 % (ref 12.3–15.4)
GARDNERELLA VAGINALIS: NEGATIVE
GLOBULIN UR ELPH-MCNC: 3.2 GM/DL
GLUCOSE SERPL-MCNC: 89 MG/DL (ref 65–99)
HBA1C MFR BLD: 5.2 % (ref 4.8–5.6)
HCT VFR BLD AUTO: 42.5 % (ref 34–46.6)
HGB BLD-MCNC: 13.8 G/DL (ref 12–15.9)
IMM GRANULOCYTES # BLD AUTO: 0.03 10*3/MM3 (ref 0–0.05)
IMM GRANULOCYTES NFR BLD AUTO: 0.4 % (ref 0–0.5)
LYMPHOCYTES # BLD AUTO: 1.6 10*3/MM3 (ref 0.7–3.1)
LYMPHOCYTES NFR BLD AUTO: 19 % (ref 19.6–45.3)
MCH RBC QN AUTO: 26.8 PG (ref 26.6–33)
MCHC RBC AUTO-ENTMCNC: 32.5 G/DL (ref 31.5–35.7)
MCV RBC AUTO: 82.5 FL (ref 79–97)
MONOCYTES # BLD AUTO: 0.71 10*3/MM3 (ref 0.1–0.9)
MONOCYTES NFR BLD AUTO: 8.4 % (ref 5–12)
N GONORRHOEA RRNA SPEC QL NAA+PROBE: NOT DETECTED
NEUTROPHILS NFR BLD AUTO: 6.03 10*3/MM3 (ref 1.7–7)
NEUTROPHILS NFR BLD AUTO: 71.4 % (ref 42.7–76)
NRBC BLD AUTO-RTO: 0 /100 WBC (ref 0–0.2)
PLATELET # BLD AUTO: 285 10*3/MM3 (ref 140–450)
PMV BLD AUTO: 11.6 FL (ref 6–12)
POTASSIUM SERPL-SCNC: 3.9 MMOL/L (ref 3.5–5.2)
PROT SERPL-MCNC: 7.8 G/DL (ref 6–8.5)
RBC # BLD AUTO: 5.15 10*6/MM3 (ref 3.77–5.28)
SODIUM SERPL-SCNC: 141 MMOL/L (ref 136–145)
T VAGINALIS DNA VAG QL PROBE+SIG AMP: NEGATIVE
TSH SERPL DL<=0.05 MIU/L-ACNC: 1.43 UIU/ML (ref 0.27–4.2)
WBC NRBC COR # BLD: 8.44 10*3/MM3 (ref 3.4–10.8)

## 2023-04-03 PROCEDURE — 87591 N.GONORRHOEAE DNA AMP PROB: CPT | Performed by: FAMILY MEDICINE

## 2023-04-03 PROCEDURE — 87660 TRICHOMONAS VAGIN DIR PROBE: CPT | Performed by: FAMILY MEDICINE

## 2023-04-03 PROCEDURE — 84443 ASSAY THYROID STIM HORMONE: CPT | Performed by: FAMILY MEDICINE

## 2023-04-03 PROCEDURE — 87491 CHLMYD TRACH DNA AMP PROBE: CPT | Performed by: FAMILY MEDICINE

## 2023-04-03 PROCEDURE — 87510 GARDNER VAG DNA DIR PROBE: CPT | Performed by: FAMILY MEDICINE

## 2023-04-03 PROCEDURE — 83036 HEMOGLOBIN GLYCOSYLATED A1C: CPT | Performed by: FAMILY MEDICINE

## 2023-04-03 PROCEDURE — 87480 CANDIDA DNA DIR PROBE: CPT | Performed by: FAMILY MEDICINE

## 2023-04-03 PROCEDURE — 80053 COMPREHEN METABOLIC PANEL: CPT | Performed by: FAMILY MEDICINE

## 2023-04-03 PROCEDURE — 85025 COMPLETE CBC W/AUTO DIFF WBC: CPT | Performed by: FAMILY MEDICINE

## 2023-04-03 RX ORDER — NYSTATIN 100000 [USP'U]/G
POWDER TOPICAL 3 TIMES DAILY
Qty: 60 G | Refills: 2 | Status: SHIPPED | OUTPATIENT
Start: 2023-04-03 | End: 2023-05-03

## 2023-04-03 RX ORDER — NYSTATIN 100000 U/G
1 CREAM TOPICAL 2 TIMES DAILY
Qty: 30 G | Refills: 3 | Status: SHIPPED | OUTPATIENT
Start: 2023-04-03 | End: 2023-04-17

## 2023-04-03 RX ORDER — CLOBETASOL PROPIONATE 0.5 MG/G
1 OINTMENT TOPICAL 2 TIMES DAILY
COMMUNITY

## 2023-04-03 RX ORDER — MINERAL,LANOLIN OILS/PROP GLYC
1 LOTION (ML) TOPICAL 3 TIMES DAILY PRN
Qty: 89 ML | Refills: 3 | Status: SHIPPED | OUTPATIENT
Start: 2023-04-03 | End: 2023-04-13

## 2023-04-03 NOTE — PROGRESS NOTES
Answers for HPI/ROS submitted by the patient on 4/1/2023  Please describe your symptoms.: Weight gain, vulvar itch, overall health  Have you had these symptoms before?: Yes  How long have you been having these symptoms?: Greater than 2 weeks  Please list any medications you are currently taking for this condition.: Estriol cream  What is the primary reason for your visit?: Other    Chief Complaint  Chief Complaint   Patient presents with   • Establish Care   • Weight Loss   • Vaginitis       HPI:  Sonia Lima presents to [facility]    The patient presents to establish care.    The patient noticed a change in her weight in approximately 2021. She went to a weight loss clinic, where she was provided with a 1200 calorie daily diet. She was eating 800-900 calories and 22 grams of carbohydrates daily by the time she completed the program. She lost 9 pounds during this program. Her  was ill at this time and after he passed away, she regained her weight. She does not have an appetite and she has to remind herself to eat. There are 3 to 4 days monthly where she has an extreme appetite. Her normal weight for many years was 135 pounds. She currently weighs approximately 227 pounds. Her weight started to increase in the last 10 years. She had a hysterectomy, which she believes is part of her weight gain. She is trying to increase her physical activity.    The patient was ill in 12/2020, after which she had what she believed was a yeast infection. She was prescribed medication for this. This did not clear up, so she was tested for sexually transmitted diseases. She was tested for yeast, which was negative. She was prescribed a steroid cream, which did not improve her vaginal itchiness. She presented to Dr. Clemens, who informed her that she had adhesions and prescribed ointment. Dr. Clemens then prescribed Clobetasol to heal her skin, as well as estradiol twice daily for 2 weeks,  then twice weekly. The patient has found no relief with any of these treatments. She has also tried baking soda baths, Gold Bond, Aveeno, she changed her detergent, and all of her underwear with no relief. She finds temporary relief with hydrocortisone. The bottom edge of her labia major and her clitoris are the primary location of her pruritus.    The patient has pruritus under her breasts, across her back, and in her bilateral axilla.    She is losing significant amounts of hair every time she showers.    The patient has a strong family history of diabetes.    Procedures     Past History:  Medical History: has a past medical history of Cataract (2014), Detached retina, and GERD (gastroesophageal reflux disease).   Surgical History: has a past surgical history that includes Hysterectomy; Knee surgery; Wrist surgery (Right);  section; Trigger finger release; Eye surgery (? ); and Subtotal Hysterectomy ().   Family History: family history includes Alcohol abuse in her brother and brother; COPD in her brother and father; Cancer in her brother, maternal grandmother, and mother; Diabetes in her sister; Drug abuse in her brother and brother; Heart disease in her father; Hyperlipidemia in her father; Other in her maternal grandfather; Vision loss in her brother.   Social History: reports that she quit smoking about 18 months ago. Her smoking use included cigarettes. She has a 17.50 pack-year smoking history. She has never used smokeless tobacco. She reports current alcohol use. She reports that she does not use drugs.  Immunization History   Administered Date(s) Administered   • COVID-19 (PFIZER) PURPLE CAP 2021, 2021   • FluLaval/Fluzone >6mos 10/20/2021   • Fluzone Quad >6mos (Multi-dose) 10/12/2020   • Tdap 2018         Allergies: Penicillins     Medications:  Current Outpatient Medications on File Prior to Visit   Medication Sig Dispense Refill   • clobetasol (TEMOVATE) 0.05  "% ointment Apply 1 application topically to the appropriate area as directed 2 (Two) Times a Day.     • estradiol (ESTRACE VAGINAL) 0.1 MG/GM vaginal cream Place 1gm PV and massage 1gm to vulva and vestibule at night 2x/week 42.5 g 3     No current facility-administered medications on file prior to visit.        Health Maintenance Due   Topic Date Due   • COLORECTAL CANCER SCREENING  Never done   • ZOSTER VACCINE (1 of 2) Never done   • COVID-19 Vaccine (3 - Booster for Pfizer series) 07/08/2021   • HEPATITIS C SCREENING  Never done   • ANNUAL PHYSICAL  Never done       Vital Signs:   Vitals:    04/03/23 1452   BP: 118/78   Pulse: 90   Temp: 98.3 °F (36.8 °C)   Weight: 103 kg (227 lb 6.4 oz)   Height: 157.5 cm (62\")      Body mass index is 41.59 kg/m².     ROS:  Review of Systems   Constitutional: Negative for fatigue and fever.   HENT: Negative for congestion, ear pain and sinus pressure.    Respiratory: Negative for cough, chest tightness and shortness of breath.    Cardiovascular: Negative for chest pain, palpitations and leg swelling.   Gastrointestinal: Negative for abdominal pain and diarrhea.   Genitourinary: Negative for dysuria and frequency.   Neurological: Negative for speech difficulty, headache and confusion.   Psychiatric/Behavioral: Negative for agitation and behavioral problems.          Physical Exam  Vitals reviewed.   Constitutional:       Appearance: Normal appearance.   HENT:      Right Ear: Tympanic membrane normal.      Left Ear: Tympanic membrane normal.      Nose: Nose normal.   Eyes:      Extraocular Movements: Extraocular movements intact.      Conjunctiva/sclera: Conjunctivae normal.      Pupils: Pupils are equal, round, and reactive to light.   Cardiovascular:      Rate and Rhythm: Normal rate and regular rhythm.   Pulmonary:      Effort: Pulmonary effort is normal.      Breath sounds: Normal breath sounds.   Abdominal:      General: Bowel sounds are normal.   Musculoskeletal:         " General: Normal range of motion.      Cervical back: Normal range of motion.   Skin:     General: Skin is warm and dry.   Neurological:      General: No focal deficit present.      Mental Status: She is alert and oriented to person, place, and time.   Psychiatric:         Mood and Affect: Mood normal.         Behavior: Behavior normal.          Result Review   Comprehensive Metabolic Panel (08/28/2021 16:19)  Troponin (08/28/2021 16:19)  CBC & Differential (08/28/2021 16:19)       Diagnoses and all orders for this visit:    1. Chronic vaginitis (Primary)  -     Incontinent Wash (Balneol) lotion; Apply 1 application topically 3 (Three) Times a Day As Needed (vaginitis) for up to 10 days.  Dispense: 89 mL; Refill: 3  -     Gardnerella vaginalis, Trichomonas vaginalis, Candida albicans, DNA - Swab, Vagina  -     Chlamydia trachomatis, Neisseria gonorrhoeae, PCR - Swab, Vagina    2. Cutaneous candidiasis  -     nystatin (MYCOSTATIN) 755201 UNIT/GM cream; Apply 1 application topically to the appropriate area as directed 2 (Two) Times a Day for 14 days.  Dispense: 30 g; Refill: 3  -     nystatin (MYCOSTATIN) [id number] UNIT/GM powder; Apply  topically to the appropriate area as directed 3 (Three) Times a Day for 30 days.  Dispense: 60 g; Refill: 2    3. Hyperglycemia  -     Comprehensive Metabolic Panel  -     TSH  -     Hemoglobin A1c    4. Abnormal CBC  -     CBC Auto Differential          Smoking Cessation:    Sonia Lima  reports that she quit smoking about 18 months ago. Her smoking use included cigarettes. She has a 17.50 pack-year smoking history. She has never used smokeless tobacco.          Follow Up   Return in about 4 weeks (around 5/1/2023).  Patient was given instructions and counseling regarding her condition or for health maintenance advice. Please see specific information pulled into the AVS if appropriate.       Hamida Urbina MD     Transcribed from ambient dictation for Hamida Urbina MD  by Helena Moreno, Quality .    Patient verbalized consent to the visit recording.  I have personally performed the services described in this document as transcribed by the above individual, and it is both accurate and complete.  Hamida Urbina MD 4/13/2023  11:17 CDT

## 2023-04-24 ENCOUNTER — TELEPHONE (OUTPATIENT)
Dept: FAMILY MEDICINE CLINIC | Facility: CLINIC | Age: 53
End: 2023-04-24
Payer: MEDICAID

## 2023-04-24 NOTE — TELEPHONE ENCOUNTER
PATIENT HAD APPOINTMENT ON 5/4/23 AND WAS RESCHEDULED. SHE WANTED TO LET PCP KNOW THAT THE BELOW MEDICATION WAS NOT HELPING AND THAT SHE WAS STILL HAVING SYMPTOMS.    nystatin (MYCOSTATIN) [id number] UNIT/GM powder

## 2023-04-27 NOTE — TELEPHONE ENCOUNTER
HUB TO READ    Patient message relayed to Dr. Urbina.  Per Dr. Urbina she will try to find an alternative to help patient.  Patient to keep upcoming apt with Dr. Urbina to discuss alternatives.

## 2023-05-30 ENCOUNTER — PATIENT MESSAGE (OUTPATIENT)
Dept: FAMILY MEDICINE CLINIC | Facility: CLINIC | Age: 53
End: 2023-05-30

## 2023-05-30 ENCOUNTER — TELEPHONE (OUTPATIENT)
Dept: FAMILY MEDICINE CLINIC | Facility: CLINIC | Age: 53
End: 2023-05-30

## 2023-05-30 NOTE — TELEPHONE ENCOUNTER
"Called and spoke with patient.  Patient was advised that her lab tests were all normal.  Patient verbally stated understanding.  Patient requesting \"magic cream\" for under her breasts.  Patient's  had this cream for bed sores and it has helped her.  When she has used Nystatin cream by itself it caused her to break out in welts and she has tried many other creams that have not helped.  Patient stated she tried her husbands cream and it helped and was wanting to obtain a script if possible.  Advised patient Dr. Urbina out but I would speak with another provider to see if this is something we can possibly send in for her.  Patient verbally stated understanding.  "

## 2023-05-30 NOTE — TELEPHONE ENCOUNTER
Caller: Sonia Mathews    Relationship to patient: Self    Best call back number: [phone]    Chief complaint: TEST FOLLOW-UP    Type of visit: OFFICE    Requested date: ASAP     If rescheduling, when is the original appointment: 05/30/23     Additional notes:THE PATIENT STATED PROVIDER HAS CANCELLED HER APPOINTMENT AGAIN AND HAS GONE TWO  MONTHS WITHOUT BEING ABLE TO GO OVER HER TEST RESULTS. SHE STATED HER APPOINTMENT KEEPS GETTING CANCELED. SHE WOULD LIKE A CALL BACK TO RESCHEDULE AND DISCUSS TEST RESULTS.

## 2023-08-02 LAB
ALBUMIN SERPL-MCNC: 4.1 G/DL (ref 3.5–5.2)
ALBUMIN/GLOB SERPL: 1.2 G/DL
ALP SERPL-CCNC: 100 U/L (ref 39–117)
ALT SERPL W P-5'-P-CCNC: 9 U/L (ref 1–33)
ANION GAP SERPL CALCULATED.3IONS-SCNC: 11.5 MMOL/L (ref 5–15)
AST SERPL-CCNC: 14 U/L (ref 1–32)
BASOPHILS # BLD AUTO: 0.07 10*3/MM3 (ref 0–0.2)
BASOPHILS NFR BLD AUTO: 0.8 % (ref 0–1.5)
BILIRUB SERPL-MCNC: 0.2 MG/DL (ref 0–1.2)
BILIRUB UR QL STRIP: NEGATIVE
BUN SERPL-MCNC: 17 MG/DL (ref 6–20)
BUN/CREAT SERPL: 23.9 (ref 7–25)
CALCIUM SPEC-SCNC: 9 MG/DL (ref 8.6–10.5)
CHLORIDE SERPL-SCNC: 102 MMOL/L (ref 98–107)
CLARITY UR: CLEAR
CO2 SERPL-SCNC: 24.5 MMOL/L (ref 22–29)
COLOR UR: YELLOW
CREAT SERPL-MCNC: 0.71 MG/DL (ref 0.57–1)
DEPRECATED RDW RBC AUTO: 40.4 FL (ref 37–54)
EGFRCR SERPLBLD CKD-EPI 2021: 101.8 ML/MIN/1.73
EOSINOPHIL # BLD AUTO: 0.01 10*3/MM3 (ref 0–0.4)
EOSINOPHIL NFR BLD AUTO: 0.1 % (ref 0.3–6.2)
ERYTHROCYTE [DISTWIDTH] IN BLOOD BY AUTOMATED COUNT: 13.4 % (ref 12.3–15.4)
GLOBULIN UR ELPH-MCNC: 3.5 GM/DL
GLUCOSE SERPL-MCNC: 100 MG/DL (ref 65–99)
GLUCOSE UR STRIP-MCNC: NEGATIVE MG/DL
HCT VFR BLD AUTO: 39.5 % (ref 34–46.6)
HGB BLD-MCNC: 13.1 G/DL (ref 12–15.9)
HGB UR QL STRIP.AUTO: NEGATIVE
HOLD SPECIMEN: NORMAL
HOLD SPECIMEN: NORMAL
IMM GRANULOCYTES # BLD AUTO: 0.03 10*3/MM3 (ref 0–0.05)
IMM GRANULOCYTES NFR BLD AUTO: 0.3 % (ref 0–0.5)
KETONES UR QL STRIP: NEGATIVE
LEUKOCYTE ESTERASE UR QL STRIP.AUTO: NEGATIVE
LIPASE SERPL-CCNC: 43 U/L (ref 13–60)
LYMPHOCYTES # BLD AUTO: 1.79 10*3/MM3 (ref 0.7–3.1)
LYMPHOCYTES NFR BLD AUTO: 19.5 % (ref 19.6–45.3)
MCH RBC QN AUTO: 27.6 PG (ref 26.6–33)
MCHC RBC AUTO-ENTMCNC: 33.2 G/DL (ref 31.5–35.7)
MCV RBC AUTO: 83.2 FL (ref 79–97)
MONOCYTES # BLD AUTO: 0.62 10*3/MM3 (ref 0.1–0.9)
MONOCYTES NFR BLD AUTO: 6.8 % (ref 5–12)
NEUTROPHILS NFR BLD AUTO: 6.64 10*3/MM3 (ref 1.7–7)
NEUTROPHILS NFR BLD AUTO: 72.5 % (ref 42.7–76)
NITRITE UR QL STRIP: NEGATIVE
NRBC BLD AUTO-RTO: 0 /100 WBC (ref 0–0.2)
PH UR STRIP.AUTO: 6 [PH] (ref 5–8)
PLATELET # BLD AUTO: 251 10*3/MM3 (ref 140–450)
PMV BLD AUTO: 10.6 FL (ref 6–12)
POTASSIUM SERPL-SCNC: 3.8 MMOL/L (ref 3.5–5.2)
PROT SERPL-MCNC: 7.6 G/DL (ref 6–8.5)
PROT UR QL STRIP: NEGATIVE
RBC # BLD AUTO: 4.75 10*6/MM3 (ref 3.77–5.28)
SODIUM SERPL-SCNC: 138 MMOL/L (ref 136–145)
SP GR UR STRIP: 1.01 (ref 1–1.03)
UROBILINOGEN UR QL STRIP: NORMAL
WBC NRBC COR # BLD: 9.16 10*3/MM3 (ref 3.4–10.8)
WHOLE BLOOD HOLD COAG: NORMAL
WHOLE BLOOD HOLD SPECIMEN: NORMAL

## 2023-08-02 PROCEDURE — 81003 URINALYSIS AUTO W/O SCOPE: CPT

## 2023-08-02 PROCEDURE — 85025 COMPLETE CBC W/AUTO DIFF WBC: CPT

## 2023-08-02 PROCEDURE — 80053 COMPREHEN METABOLIC PANEL: CPT

## 2023-08-02 PROCEDURE — 99283 EMERGENCY DEPT VISIT LOW MDM: CPT

## 2023-08-02 PROCEDURE — 36415 COLL VENOUS BLD VENIPUNCTURE: CPT

## 2023-08-02 PROCEDURE — 83690 ASSAY OF LIPASE: CPT

## 2023-08-02 RX ORDER — SODIUM CHLORIDE 0.9 % (FLUSH) 0.9 %
10 SYRINGE (ML) INJECTION AS NEEDED
Status: DISCONTINUED | OUTPATIENT
Start: 2023-08-02 | End: 2023-08-03 | Stop reason: HOSPADM

## 2023-08-03 ENCOUNTER — HOSPITAL ENCOUNTER (EMERGENCY)
Facility: HOSPITAL | Age: 53
Discharge: HOME | End: 2023-08-03
Attending: EMERGENCY MEDICINE
Payer: MEDICAID

## 2023-08-03 VITALS
BODY MASS INDEX: 43.21 KG/M2 | TEMPERATURE: 98.3 F | OXYGEN SATURATION: 95 % | HEIGHT: 62 IN | DIASTOLIC BLOOD PRESSURE: 68 MMHG | WEIGHT: 234.79 LBS | HEART RATE: 92 BPM | SYSTOLIC BLOOD PRESSURE: 114 MMHG | RESPIRATION RATE: 18 BRPM

## 2023-08-03 DIAGNOSIS — M46.1 SI (SACROILIAC) JOINT INFLAMMATION: Primary | ICD-10-CM

## 2023-08-03 PROCEDURE — 96375 TX/PRO/DX INJ NEW DRUG ADDON: CPT

## 2023-08-03 PROCEDURE — 25010000002 METHOCARBAMOL 1000 MG/10ML SOLUTION: Performed by: NURSE PRACTITIONER

## 2023-08-03 PROCEDURE — 25010000002 KETOROLAC TROMETHAMINE PER 15 MG: Performed by: NURSE PRACTITIONER

## 2023-08-03 PROCEDURE — 96374 THER/PROPH/DIAG INJ IV PUSH: CPT

## 2023-08-03 PROCEDURE — 25010000002 DEXAMETHASONE SODIUM PHOSPHATE 10 MG/ML SOLUTION: Performed by: NURSE PRACTITIONER

## 2023-08-03 RX ORDER — KETOROLAC TROMETHAMINE 30 MG/ML
30 INJECTION, SOLUTION INTRAMUSCULAR; INTRAVENOUS ONCE
Status: COMPLETED | OUTPATIENT
Start: 2023-08-03 | End: 2023-08-03

## 2023-08-03 RX ORDER — METHOCARBAMOL 500 MG/1
500 TABLET, FILM COATED ORAL 4 TIMES DAILY PRN
Qty: 30 TABLET | Refills: 0 | Status: SHIPPED | OUTPATIENT
Start: 2023-08-03

## 2023-08-03 RX ORDER — DEXAMETHASONE SODIUM PHOSPHATE 10 MG/ML
10 INJECTION, SOLUTION INTRAMUSCULAR; INTRAVENOUS ONCE
Status: COMPLETED | OUTPATIENT
Start: 2023-08-03 | End: 2023-08-03

## 2023-08-03 RX ORDER — PREDNISONE 20 MG/1
60 TABLET ORAL DAILY
Qty: 15 TABLET | Refills: 0 | Status: SHIPPED | OUTPATIENT
Start: 2023-08-03 | End: 2023-08-08

## 2023-08-03 RX ADMIN — KETOROLAC TROMETHAMINE 30 MG: 30 INJECTION, SOLUTION INTRAMUSCULAR; INTRAVENOUS at 00:54

## 2023-08-03 RX ADMIN — DEXAMETHASONE SODIUM PHOSPHATE 10 MG: 10 INJECTION INTRAMUSCULAR; INTRAVENOUS at 00:54

## 2023-08-03 RX ADMIN — METHOCARBAMOL 1000 MG: 1000 INJECTION, SOLUTION INTRAMUSCULAR; INTRAVENOUS at 00:53

## 2023-08-03 NOTE — DISCHARGE INSTRUCTIONS
Testing here today was unremarkable.    Your pain seems to be located in your SI joint and is usually just inflammation.    Take medications as prescribed for symptomatic treatment.    Warm compresses to affected area.  May alternate with ice.    Limit lifting to less than 5 pounds to avoid aggravation.    Follow-up with your PCP if no better

## 2023-08-03 NOTE — ED PROVIDER NOTES
Time: 10:46 PM EDT  Date of encounter:  2023  Independent Historian/Clinical History and Information was obtained by:   Patient    History is limited by: N/A    Chief Complaint   Patient presents with    Flank Pain    Dysuria         History of Present Illness:  Patient is a 53 y.o. year old female who presents to the emergency department for evaluation of low back pain and dysuria.  Patient states she has had some mild back pain for last few days no known injury.  Today she was picking up a close basket and felt some mild pull in this area in her right lower back.  She thought to herself that she needed not to do any bending and lifting because it was painful.  Pain went away until later in the afternoon when she was at the store she felt sudden pain right lower back.  And felt like she really needed to urinate.  Patient denies any actual painful urination or blood in urine.  Pain is sharp and stabbing and located in the right lower back and does not radiate.  No numbness tingling or weakness.  HPI    Patient Care Team  Primary Care Provider: Hamida Urbina MD    Past Medical History:     Allergies   Allergen Reactions    Nystatin Hives     Patient states that nystatin cream by itself causes hives under her breasts but has been able to use creams that have nystatin in it with other things with no reactions    Penicillins Swelling     Past Medical History:   Diagnosis Date    Cataract 2014    Cataract surgery    Detached retina     GERD (gastroesophageal reflux disease)      Past Surgical History:   Procedure Laterality Date     SECTION      EYE SURGERY  ?     Cataract, detached retina    HYSTERECTOMY      KNEE SURGERY      SUBTOTAL HYSTERECTOMY  2015    TRIGGER FINGER RELEASE      WRIST SURGERY Right      Family History   Problem Relation Age of Onset    Cancer Mother         Pre cancer right breast 86    COPD Father     Heart disease Father     Hyperlipidemia Father     Other Maternal  Grandfather         ALS    Cancer Maternal Grandmother         Ovarian    Alcohol abuse Brother     Cancer Brother         Colon cancer    Drug abuse Brother         Alcohol    Alcohol abuse Brother     COPD Brother     Drug abuse Brother         Alcohol and drugs    Diabetes Sister     Vision loss Brother         Glaucoma       Home Medications:  Prior to Admission medications    Medication Sig Start Date End Date Taking? Authorizing Provider   clobetasol (TEMOVATE) 0.05 % ointment Apply 1 application topically to the appropriate area as directed 2 (Two) Times a Day.    Provider, MD Perico   estradiol (ESTRACE VAGINAL) 0.1 MG/GM vaginal cream Place 1gm PV and massage 1gm to vulva and vestibule at night 2x/week 22   Sushila Clemens MD        Social History:   Social History     Tobacco Use    Smoking status: Former     Packs/day: 0.50     Years: 35.00     Pack years: 17.50     Types: Cigarettes     Quit date: 10/1/2021     Years since quittin.8    Smokeless tobacco: Never   Vaping Use    Vaping Use: Former   Substance Use Topics    Alcohol use: Yes     Comment: SOCIAL    Drug use: Never         Review of Systems:  Review of Systems   Constitutional:  Negative for chills and fever.   HENT:  Negative for congestion, ear pain and sore throat.    Eyes:  Negative for pain.   Respiratory:  Negative for cough, chest tightness and shortness of breath.    Cardiovascular:  Negative for chest pain.   Gastrointestinal:  Negative for abdominal pain, diarrhea, nausea and vomiting.   Genitourinary:  Positive for difficulty urinating and frequency. Negative for dysuria, flank pain and hematuria.   Musculoskeletal:  Positive for back pain. Negative for joint swelling.   Skin:  Negative for pallor.   Neurological:  Negative for seizures and headaches.   Hematological: Negative.    Psychiatric/Behavioral: Negative.     All other systems reviewed and are negative.     Physical Exam:  /68   Pulse 92   Temp 98.3 øF  "(36.8 øC) (Oral)   Resp 18   Ht 157.5 cm (62\")   Wt 107 kg (234 lb 12.6 oz)   SpO2 95%   BMI 42.94 kg/mý         Physical Exam  Vitals and nursing note reviewed.   Constitutional:       General: She is not in acute distress.     Appearance: Normal appearance. She is not toxic-appearing.   HENT:      Head: Normocephalic and atraumatic.      Nose: Nose normal.      Mouth/Throat:      Mouth: Mucous membranes are moist.   Eyes:      General: No scleral icterus.     Conjunctiva/sclera: Conjunctivae normal.   Cardiovascular:      Rate and Rhythm: Normal rate and regular rhythm.      Pulses: Normal pulses.      Heart sounds: Normal heart sounds.   Pulmonary:      Effort: Pulmonary effort is normal. No respiratory distress.      Breath sounds: Normal breath sounds.   Abdominal:      General: Bowel sounds are normal. There is no distension.      Palpations: Abdomen is soft.      Tenderness: There is no abdominal tenderness. There is no right CVA tenderness or left CVA tenderness.   Musculoskeletal:         General: Tenderness (Pain tenderness is at the patient's right SI joint.  Pain with palpation and with flexion, extension and rotation) present. No swelling.      Cervical back: Normal range of motion and neck supple. No tenderness.   Skin:     General: Skin is warm and dry.   Neurological:      General: No focal deficit present.      Mental Status: She is alert and oriented to person, place, and time. Mental status is at baseline.      Sensory: No sensory deficit.      Motor: No weakness.   Psychiatric:         Mood and Affect: Mood normal.         Behavior: Behavior normal.          Procedures:  Procedures      Medical Decision Making:      Comorbidities that affect care:    None    External Notes reviewed:    Previous Clinic Note: Patient's last clinic note was in her office for chronic vaginitis and Candida on April 3      The following orders were placed and all results were independently analyzed by me:  Orders " Placed This Encounter   Procedures    [facility] Draw    Comprehensive Metabolic Panel    Lipase    Urinalysis With Microscopic If Indicated (No Culture) - Urine, Clean Catch    CBC Auto Differential    Insert Peripheral IV    CBC & Differential    Green Top (Gel)    Lavender Top    Gold Top - SST    Light Blue Top       Medications Given in the Emergency Department:  Medications   sodium chloride 0.9 % flush 10 mL (has no administration in time range)   ketorolac (TORADOL) injection 30 mg (30 mg Intravenous Given 8/3/23 0054)   methocarbamol (ROBAXIN) 1000 mg/100 mL 0.9% NS MBP (1,000 mg Intravenous Given 8/3/23 0053)   dexAMETHasone sodium phosphate injection 10 mg (10 mg Intravenous Given 8/3/23 0054)        ED Course:    The patient was initially evaluated in the triage area where orders were placed. The patient was later dispositioned by PAO Brown.      The patient was advised to stay for completion of workup which includes but is not limited to communication of labs and radiological results, reassessment and plan. The patient was advised that leaving prior to disposition by a provider could result in critical findings that are not communicated to the patient.     ED Course as of 08/03/23 0205   Wed Aug 02, 2023   2247 PROVIDER IN TRIAGE  Patient was evaluated by me in triage, Jose Bowen PA-C.  Orders were placed and patient is currently awaiting final results and disposition.  [MD]      ED Course User Index  [MD] Jose Bowen PA-C       Labs:    Lab Results (last 24 hours)       Procedure Component Value Units Date/Time    CBC & Differential [[id number]]  (Abnormal) Collected: 08/02/23 2255    Specimen: Blood Updated: 08/02/23 2307    Narrative:      The following orders were created for panel order CBC & Differential.  Procedure                               Abnormality         Status                     ---------                               -----------         ------                     CBC  Auto Differential[[id number]]        Abnormal            Final result                 Please view results for these tests on the individual orders.    Comprehensive Metabolic Panel [[id number]]  (Abnormal) Collected: 08/02/23 2255    Specimen: Blood Updated: 08/02/23 2325     Glucose 100 mg/dL      BUN 17 mg/dL      Creatinine 0.71 mg/dL      Sodium 138 mmol/L      Potassium 3.8 mmol/L      Chloride 102 mmol/L      CO2 24.5 mmol/L      Calcium 9.0 mg/dL      Total Protein 7.6 g/dL      Albumin 4.1 g/dL      ALT (SGPT) 9 U/L      AST (SGOT) 14 U/L      Alkaline Phosphatase 100 U/L      Total Bilirubin 0.2 mg/dL      Globulin 3.5 gm/dL      A/G Ratio 1.2 g/dL      BUN/Creatinine Ratio 23.9     Anion Gap 11.5 mmol/L      eGFR 101.8 mL/min/1.73     Narrative:      GFR Normal >60  Chronic Kidney Disease <60  Kidney Failure <15      Lipase [[id number]]  (Normal) Collected: 08/02/23 2255    Specimen: Blood Updated: 08/02/23 2325     Lipase 43 U/L     CBC Auto Differential [[id number]]  (Abnormal) Collected: 08/02/23 2255    Specimen: Blood Updated: 08/02/23 2307     WBC 9.16 10*3/mm3      RBC 4.75 10*6/mm3      Hemoglobin 13.1 g/dL      Hematocrit 39.5 %      MCV 83.2 fL      MCH 27.6 pg      MCHC 33.2 g/dL      RDW 13.4 %      RDW-SD 40.4 fl      MPV 10.6 fL      Platelets 251 10*3/mm3      Neutrophil % 72.5 %      Lymphocyte % 19.5 %      Monocyte % 6.8 %      Eosinophil % 0.1 %      Basophil % 0.8 %      Immature Grans % 0.3 %      Neutrophils, Absolute 6.64 10*3/mm3      Lymphocytes, Absolute 1.79 10*3/mm3      Monocytes, Absolute 0.62 10*3/mm3      Eosinophils, Absolute 0.01 10*3/mm3      Basophils, Absolute 0.07 10*3/mm3      Immature Grans, Absolute 0.03 10*3/mm3      nRBC 0.0 /100 WBC     Urinalysis With Microscopic If Indicated (No Culture) - Urine, Clean Catch [[id number]]  (Normal) Collected: 08/02/23 2342    Specimen: Urine, Clean Catch Updated: 08/02/23 2353     Color, UA Yellow     Appearance, UA Clear     pH, UA  6.0     Specific Gravity, UA 1.010     Glucose, UA Negative     Ketones, UA Negative     Bilirubin, UA Negative     Blood, UA Negative     Protein, UA Negative     Leuk Esterase, UA Negative     Nitrite, UA Negative     Urobilinogen, UA 0.2 E.U./dL    Narrative:      Urine microscopic not indicated.             Imaging:    No Radiology Exams Resulted Within Past 24 Hours      Differential Diagnosis and Discussion:      Dysuria: Differential diagnosis includes but is not limited to urethritis, cystitis, pyelonephritis, ureteral calculi, neoplasm, chemical irritant, urethral stricture, and trauma    All labs were reviewed and interpreted by me.    MDM  Number of Diagnoses or Management Options  SI (sacroiliac) joint inflammation  Diagnosis management comments: The patient's symptoms are consistent with musculoskeletal back pain. The patient is now resting comfortably, feels better, is alert, talkative, interactive and in no distress. The repeat examination is unremarkable and benign. The patient is neurologically intact and is ambulatory in the ED. The patient has no fever, no bowel or bladder incontinence, no saddle anesthesia, and is otherwise alert and well appearing. The history, physical exam, and diagnostics (if any) do not suggest the presence of acute spinal epidural abscess, acute spinal epidural bleed, cauda equina syndrome, abdominal aortic aneurysm, aortic dissection or other process requiring further testing, treatment or consultation in the emergency department. The vital signs have been stable. The patient's condition is stable and appropriate for discharge. The patient will pursue further outpatient evaluation with the primary care physician or other designated for consulting position as indicated in the discharge instructions.         Amount and/or Complexity of Data Reviewed  Clinical lab tests: reviewed and ordered  Tests in the medicine section of [facility]: ordered and reviewed  Decide to obtain  previous medical records or to obtain history from someone other than the patient: yes  Review and summarize past medical records: yes ([facility] visit April 3 for vaginitis and Candida)    Risk of Complications, Morbidity, and/or Mortality  Presenting problems: low  Diagnostic procedures: low  Management options: low    Patient Progress  Patient progress: stable       Patient Care Considerations:    I considered ordering CT abdomen and pelvis but patient's pain is not CVA area and she has no abdominal pain.  Pain is musculoskeletal in nature and located right at her SI joint.  Patient has no trauma so films are not likely to be of value      Consultants/Shared Management Plan:    None    Social Determinants of Health:    Patient is independent, reliable, and has access to care.       Disposition and Care Coordination:    Discharged: The patient is suitable and stable for discharge with no need for consideration of observation or admission.    I have explained the patient's condition, diagnoses and treatment plan based on the information available to me at this time. I have answered questions and addressed any concerns. The patient has a good  understanding of the patient's diagnosis, condition, and treatment plan as can be expected at this point. The vital signs have been stable. The patient's condition is stable and appropriate for discharge from the emergency department.      The patient will pursue further outpatient evaluation with the primary care physician or other designated or consulting physician as outlined in the discharge instructions. They are agreeable to this plan of care and follow-up instructions have been explained in detail. The patient has received these instructions in written format and have expressed an understanding of the discharge instructions. The patient is aware that any significant change in condition or worsening of symptoms should prompt an immediate return to this or the closest emergency  department or call to 911.  I have explained discharge medications and the need for follow up with the patient/caretakers. This was also printed in the discharge instructions. Patient was discharged with the following medications and follow up:      Medication List        New Prescriptions      diclofenac 50 MG EC tablet  Commonly known as: VOLTAREN  Take 1 tablet by mouth 3 (Three) Times a Day As Needed (pain).     methocarbamol 500 MG tablet  Commonly known as: ROBAXIN  Take 1 tablet by mouth 4 (Four) Times a Day As Needed for Muscle Spasms.     predniSONE 20 MG tablet  Commonly known as: DELTASONE  Take 3 tablets by mouth Daily for 5 days.               Where to Get Your Medications        These medications were sent to [facility]/pharmacy #[id number] - [place], [place] - [address] - [phone] [facility] [phone]   [address] [place] [address]      Hours: 24-hours Phone: [phone]   diclofenac 50 MG EC tablet  methocarbamol 500 MG tablet  predniSONE 20 MG tablet      Hamida Urbina MD  [address] [facility] [address]  [facility] [address] [phone]    Schedule an appointment as soon as possible for a visit          Final diagnoses:   SI (sacroiliac) joint inflammation        ED Disposition       ED Disposition   Discharge    Condition   Stable    Comment   --               This medical record created using voice recognition software.             Charu Flanagan APRN  08/03/23 0488

## 2023-08-21 ENCOUNTER — OFFICE VISIT (OUTPATIENT)
Dept: FAMILY MEDICINE CLINIC | Facility: CLINIC | Age: 53
End: 2023-08-21
Payer: MEDICAID

## 2023-08-21 VITALS
TEMPERATURE: 98.1 F | SYSTOLIC BLOOD PRESSURE: 118 MMHG | HEIGHT: 62 IN | WEIGHT: 233 LBS | OXYGEN SATURATION: 99 % | BODY MASS INDEX: 42.88 KG/M2 | DIASTOLIC BLOOD PRESSURE: 82 MMHG | HEART RATE: 83 BPM

## 2023-08-21 DIAGNOSIS — E66.01 CLASS 3 SEVERE OBESITY DUE TO EXCESS CALORIES WITHOUT SERIOUS COMORBIDITY WITH BODY MASS INDEX (BMI) OF 40.0 TO 44.9 IN ADULT: ICD-10-CM

## 2023-08-21 DIAGNOSIS — M54.31 SCIATIC NERVE PAIN, RIGHT: ICD-10-CM

## 2023-08-21 DIAGNOSIS — M54.41 ACUTE RIGHT-SIDED LOW BACK PAIN WITH RIGHT-SIDED SCIATICA: ICD-10-CM

## 2023-08-21 DIAGNOSIS — Z13.820 OSTEOPOROSIS SCREENING: Primary | ICD-10-CM

## 2023-08-21 RX ORDER — TIZANIDINE HYDROCHLORIDE 2 MG/1
2 CAPSULE, GELATIN COATED ORAL 3 TIMES DAILY
Qty: 90 CAPSULE | Refills: 0 | Status: SHIPPED | OUTPATIENT
Start: 2023-08-21 | End: 2023-08-29

## 2023-08-21 RX ORDER — PREDNISONE 10 MG/1
TABLET ORAL
COMMUNITY
Start: 2023-08-03 | End: 2023-08-21

## 2023-08-21 NOTE — PROGRESS NOTES
Chief Complaint  Chief Complaint   Patient presents with    Hospital Follow Up Visit       HPI:  Sonia Lima presents to [facility]        The patient presents for a follow-up.  ER visit from 8/3/3023 for Right back pain.    The patient is doing well.     The patient's back is painful today. She stayed in bed all day on 2023 and she went to the ER later that evening. She thought she had a kidney infection, and nothing was found on her urine sample. She was down for a week just not really doing anything. She did not do any lifting or anything. She wonders if there is some type of physical exercise or is there something to improve this.     She is 233 pounds today. She cut back on fried foods. She is gaining weight. She has tried phentermine.  Pt is interested in starting the injectable weight loss medication she has heard so much about.     The patient's rash is not gone.     The patient's mother passed on 2023.    Procedures     Past History:  Medical History: has a past medical history of Cataract (2014), Detached retina, and GERD (gastroesophageal reflux disease).   Surgical History: has a past surgical history that includes Hysterectomy; Knee surgery; Wrist surgery (Right);  section; Trigger finger release; Eye surgery (? ); and Subtotal Hysterectomy ().   Family History: family history includes Alcohol abuse in her brother and brother; COPD in her brother and father; Cancer in her brother, maternal grandmother, and mother; Diabetes in her sister; Drug abuse in her brother and brother; Heart disease in her father; Hyperlipidemia in her father; Other in her maternal grandfather; Vision loss in her brother.   Social History: reports that she quit smoking about 23 months ago. Her smoking use included cigarettes. She has a 17.50 pack-year smoking history. She has never used smokeless tobacco. She reports current alcohol use. She  "reports that she does not use drugs.  Immunization History   Administered Date(s) Administered    COVID-19 (PFIZER) Purple Cap Monovalent 04/22/2021, 05/13/2021    Fluzone >6mos 10/20/2021    Fluzone Quad >6mos (Multi-dose) 10/12/2020    Tdap 06/26/2018         Allergies: Nystatin and Penicillins     Medications:  Current Outpatient Medications on File Prior to Visit   Medication Sig Dispense Refill    clobetasol (TEMOVATE) 0.05 % ointment Apply 1 application  topically to the appropriate area as directed 2 (Two) Times a Day.      diclofenac (VOLTAREN) 50 MG EC tablet Take 1 tablet by mouth 3 (Three) Times a Day As Needed (pain). 30 tablet 0    estradiol (ESTRACE VAGINAL) 0.1 MG/GM vaginal cream Place 1gm PV and massage 1gm to vulva and vestibule at night 2x/week 42.5 g 3    methocarbamol (ROBAXIN) 500 MG tablet Take 1 tablet by mouth 4 (Four) Times a Day As Needed for Muscle Spasms. 30 tablet 0     No current facility-administered medications on file prior to visit.        Health Maintenance Due   Topic Date Due    COLORECTAL CANCER SCREENING  Never done    ZOSTER VACCINE (1 of 2) Never done    COVID-19 Vaccine (3 - Pfizer series) 07/08/2021    HEPATITIS C SCREENING  Never done    ANNUAL PHYSICAL  Never done    MAMMOGRAM  05/13/2023       Vital Signs:   Vitals:    08/21/23 0942   BP: 118/82   Pulse: 83   Temp: 98.1 °F (36.7 °C)   SpO2: 99%   Weight: 106 kg (233 lb)   Height: 157.5 cm (62\")      Body mass index is 42.62 kg/m².     ROS:  Review of Systems   Constitutional:  Negative for fatigue, fever and unexpected weight loss.   HENT:  Negative for congestion, ear pain and sinus pressure.    Respiratory:  Negative for cough, chest tightness and shortness of breath.    Cardiovascular:  Negative for chest pain, palpitations and leg swelling.   Gastrointestinal:  Negative for abdominal pain and diarrhea.   Genitourinary:  Positive for pelvic pain. Negative for dysuria, frequency and urinary incontinence. "   Musculoskeletal:  Positive for back pain.   Neurological:  Negative for speech difficulty, weakness, numbness, headache and confusion.   Psychiatric/Behavioral:  Negative for agitation and behavioral problems.         Physical Exam  Vitals reviewed.   Constitutional:       Appearance: Normal appearance.   HENT:      Right Ear: Tympanic membrane normal.      Left Ear: Tympanic membrane normal.      Nose: Nose normal.   Eyes:      Extraocular Movements: Extraocular movements intact.      Conjunctiva/sclera: Conjunctivae normal.      Pupils: Pupils are equal, round, and reactive to light.   Cardiovascular:      Rate and Rhythm: Normal rate and regular rhythm.   Pulmonary:      Effort: Pulmonary effort is normal.      Breath sounds: Normal breath sounds.   Abdominal:      General: Bowel sounds are normal.   Musculoskeletal:         General: Normal range of motion.      Cervical back: Normal range of motion.   Skin:     General: Skin is warm and dry.   Neurological:      General: No focal deficit present.      Mental Status: She is alert and oriented to person, place, and time.   Psychiatric:         Mood and Affect: Mood normal.         Behavior: Behavior normal.        Result Review   Comprehensive Metabolic Panel (08/02/2023 22:55)      Diagnoses and all orders for this visit:    1. Osteoporosis screening (Primary)  -     Dexa Bone Density, Axial (Every 2 Years); Future    2. Sciatic nerve pain, right  -     Ambulatory Referral to Physical Therapy    3. Acute right-sided low back pain with right-sided sciatica  -     XR Spine Lumbar AP & Lateral; Future  -     Discontinue: TiZANidine (ZANAFLEX) 2 MG capsule; Take 1 capsule by mouth 3 (Three) Times a Day for 30 days.  Dispense: 90 capsule; Refill: 0  -     Ambulatory Referral to Physical Therapy    4. Class 3 severe obesity due to excess calories without serious comorbidity with body mass index (BMI) of 40.0 to 44.9 in adult  -     Semaglutide-Weight Management 0.25  MG/0.5ML solution auto-injector; Inject 0.25 mg under the skin into the appropriate area as directed 1 (One) Time Per Week for 30 days.  Dispense: 2 mL; Refill: 0  -     Lipid Panel          Smoking Cessation:    Sonia Lima  reports that she quit smoking about 23 months ago. Her smoking use included cigarettes. She has a 17.50 pack-year smoking history. She has never used smokeless tobacco.          Follow Up   Return in about 3 months (around 11/21/2023).  Patient was given instructions and counseling regarding her condition or for health maintenance advice. Please see specific information pulled into the AVS if appropriate.       Emily Johns submitted by the patient for this visit:  Primary Reason for Visit (Submitted on 8/20/2023)  What is the primary reason for your visit?: Back Pain  Back Pain Questionnaire (Submitted on 8/20/2023)  Chief Complaint: Back pain  Chronicity: new  Onset: 1 to 4 weeks ago  Frequency: daily  Progression since onset: waxing and waning  Pain location: gluteal, sacro-iliac  Pain quality: aching, stabbing  Radiates to: does not radiate  Pain - numeric: 7/10  Pain is: the same all the time  Aggravated by: bending, position, twisting  bowel incontinence: No  headaches: No  leg pain: No  paresis: No  paresthesias: No  perianal numbness: No  tingling: No  Risk factors: obesity      Transcribed from ambient dictation for Hamida Urbina MD by Christa Cardoso.  08/21/23   12:29 EDT    Patient or patient representative verbalized consent to the visit recording.  I have personally performed the services described in this document as transcribed by the above individual, and it is both accurate and complete.

## 2023-08-22 ENCOUNTER — TELEPHONE (OUTPATIENT)
Dept: FAMILY MEDICINE CLINIC | Facility: CLINIC | Age: 53
End: 2023-08-22

## 2023-08-22 ENCOUNTER — HOSPITAL ENCOUNTER (OUTPATIENT)
Dept: GENERAL RADIOLOGY | Facility: HOSPITAL | Age: 53
Discharge: HOME | End: 2023-08-22
Admitting: FAMILY MEDICINE
Payer: MEDICAID

## 2023-08-22 DIAGNOSIS — M54.41 ACUTE RIGHT-SIDED LOW BACK PAIN WITH RIGHT-SIDED SCIATICA: ICD-10-CM

## 2023-08-22 PROCEDURE — 72100 X-RAY EXAM L-S SPINE 2/3 VWS: CPT

## 2023-08-22 NOTE — TELEPHONE ENCOUNTER
Caller: Sonia Mathews    Relationship: Self    Best call back number: [phone]    What is the best time to reach you: ANYTIME    Who are you requesting to speak with (clinical staff, provider,  specific staff member): NURSE     What was the call regarding: PATIENT IS CALLING REGARDING HER PAIN MEDICATION, PHARMACY STATES THAT THEY NEED A PRIOR AUTHORIZATION ON IT AND SENT IT OVER TO PCP BUT HAS NOT HEARD ANYTHING BACK.  PATIENT STATES SHE HAS ABOUT 3 PILLS REMAINING AND SHE WILL BE OUT.     st.”

## 2023-08-22 NOTE — PROGRESS NOTES
Please call and inform patient that she has arthritis changes in her lower back.  She should start physical therapy to see if that helps.

## 2023-08-29 ENCOUNTER — TELEPHONE (OUTPATIENT)
Dept: FAMILY MEDICINE CLINIC | Facility: CLINIC | Age: 53
End: 2023-08-29

## 2023-08-29 RX ORDER — CYCLOBENZAPRINE HCL 10 MG
10 TABLET ORAL 3 TIMES DAILY PRN
Qty: 30 TABLET | Refills: 1 | Status: SHIPPED | OUTPATIENT
Start: 2023-08-29 | End: 2023-09-08

## 2023-08-29 NOTE — TELEPHONE ENCOUNTER
Caller: Sonia Mathews    Relationship: Self    Best call back number: [phone]     What medication are you requesting: TiZANidine (ZANAFLEX) 2 MG TABLET      If a prescription is needed, what is your preferred pharmacy and phone number: [facility]/PHARMACY #[id number] - [place], [place] - [address] [facility] - [phone]  - [phone]     Additional notes:  PATIENT REPORTS THAT [facility] TOLD HER THAT THE PRESCRIPTION IN NOT COVERED MY HER INSURANCE IN CAPSULE FORM -  NEED TO RESEND WITH TABLET FORM THAT INSURANCE WILL COVER     PLEASE CALL PATIENT WITH UPDATE      PATIENT HAS BEEN TRYING TO GET MEDICATION SINCE OFFICE VISIT ON 08/21

## 2023-08-29 NOTE — TELEPHONE ENCOUNTER
Ayan message sent to patient advising her of the new medication that was sent in for her per Dr. Urbina.

## 2023-09-06 ENCOUNTER — HOSPITAL ENCOUNTER (OUTPATIENT)
Dept: BONE DENSITY | Facility: HOSPITAL | Age: 53
Discharge: HOME | End: 2023-09-06
Admitting: FAMILY MEDICINE
Payer: MEDICAID

## 2023-09-06 DIAGNOSIS — Z13.820 OSTEOPOROSIS SCREENING: ICD-10-CM

## 2023-09-06 PROCEDURE — 77080 DXA BONE DENSITY AXIAL: CPT

## 2023-09-28 ENCOUNTER — OFFICE VISIT (OUTPATIENT)
Dept: FAMILY MEDICINE CLINIC | Facility: CLINIC | Age: 53
End: 2023-09-28
Payer: MEDICAID

## 2023-09-28 VITALS
OXYGEN SATURATION: 98 % | TEMPERATURE: 98 F | WEIGHT: 219 LBS | HEART RATE: 81 BPM | RESPIRATION RATE: 14 BRPM | DIASTOLIC BLOOD PRESSURE: 80 MMHG | HEIGHT: 62 IN | SYSTOLIC BLOOD PRESSURE: 122 MMHG | BODY MASS INDEX: 40.3 KG/M2

## 2023-09-28 DIAGNOSIS — Z12.31 ENCOUNTER FOR SCREENING MAMMOGRAM FOR MALIGNANT NEOPLASM OF BREAST: ICD-10-CM

## 2023-09-28 DIAGNOSIS — Z12.11 SCREEN FOR COLON CANCER: Primary | ICD-10-CM

## 2023-09-28 DIAGNOSIS — K59.01 SLOW TRANSIT CONSTIPATION: ICD-10-CM

## 2023-09-28 DIAGNOSIS — L98.9 SKIN LESION OF FACE: ICD-10-CM

## 2023-09-28 DIAGNOSIS — N64.59 ABNORMAL BREAST EXAM: ICD-10-CM

## 2023-09-28 RX ORDER — DEXAMETHASONE 4 MG/1
TABLET ORAL
COMMUNITY
Start: 2023-09-05

## 2023-09-28 RX ORDER — APREPITANT 40 MG/1
CAPSULE ORAL
COMMUNITY
Start: 2023-09-06

## 2023-09-28 RX ORDER — ONDANSETRON 4 MG/1
TABLET, ORALLY DISINTEGRATING ORAL
COMMUNITY
Start: 2023-09-05

## 2023-09-28 RX ORDER — PANTOPRAZOLE SODIUM 40 MG/1
40 TABLET, DELAYED RELEASE ORAL DAILY
COMMUNITY

## 2023-09-28 RX ORDER — CEPHALEXIN 250 MG
CAPSULE ORAL
COMMUNITY

## 2023-09-28 RX ORDER — HYDROCODONE BITARTRATE AND ACETAMINOPHEN 5; 325 MG/1; MG/1
TABLET ORAL
COMMUNITY
Start: 2023-09-13

## 2023-09-28 RX ORDER — WHEAT DEXTRIN/ASPARTAME 3 G/6 G
POWDER IN PACKET (EA) ORAL
COMMUNITY

## 2023-09-28 RX ORDER — GLUCOSAMINE/D3/BOSWELLIA SERRA 1500MG-400
TABLET ORAL
COMMUNITY

## 2023-09-28 NOTE — PROGRESS NOTES
Chief Complaint  Chief Complaint   Patient presents with    Obesity     Gastric sleeve placed on     Breast Problem     Rt/ skin changed        HPI:  Sonia Lima presents to [facility]    The patient presents for follow-up.    She is feeling well after her gastric sleeve procedure. She gets constipated with protein, so she takes Benefiber. She uses Boost or other protein shakes for drinks. She had not had a bowel movement in 4 days and was in a lot of pain. Preoperatively, she took magnesium citrate, and it took 6 hours to have a bowel movement. She has been doing a lot of walking and trying to get at least 2 miles. She had a hiatal hernia repaired during the procedure.    She is due for a colonoscopy. She was scheduled to have one with Dr. Jo; however, she could not go. She admits to a family history of colon cancer in her brother. He had surgery and had 3 feet of colon removed. Her mother had polyps removed. She is 2 weeks out from surgery now.    She has a rash that itches on the right breast and is in a [place]. It started after the itch on the labia majora started. It is not painful and does not radiate.    Procedures     Past History:  Medical History: has a past medical history of Cataract (2014), Detached retina, and GERD (gastroesophageal reflux disease).   Surgical History: has a past surgical history that includes Hysterectomy; Knee surgery; Wrist surgery (Right);  section; Trigger finger release; Eye surgery (? ); Subtotal Hysterectomy (); and GASTRIC SLEEVE WITH VENTRAL HERNIA REPAIR.   Family History: family history includes Alcohol abuse in her brother and brother; COPD in her brother and father; Cancer in her brother, maternal grandmother, and mother; Diabetes in her sister; Drug abuse in her brother and brother; Heart disease in her father; Hyperlipidemia in her father; Other in her maternal grandfather; Vision loss in  her brother.   Social History: reports that she quit smoking about 2 years ago. Her smoking use included cigarettes. She has a 17.50 pack-year smoking history. She has never used smokeless tobacco. She reports current alcohol use. She reports that she does not use drugs.  Immunization History   Administered Date(s) Administered    COVID-19 (PFIZER) Purple Cap Monovalent 04/22/2021, 05/13/2021    COVID-19 (UNSPECIFIED) 09/21/2023    Fluzone (or Fluarix & Flulaval for VFC) >6mos 10/20/2021    Fluzone Quad >6mos (Multi-dose) 10/12/2020    Influenza, Unspecified 09/21/2023    Tdap 06/26/2018         Allergies: Nystatin and Penicillins     Medications:  Current Outpatient Medications on File Prior to Visit   Medication Sig Dispense Refill    apixaban (ELIQUIS) 2.5 MG tablet tablet Take 1 tablet by mouth 2 (Two) Times a Day.      Biotin 53028 MCG tablet Take  by mouth.      Calcium Citrate-Vitamin D (CALCIUM CITRATE + PO) Take 600 mg by mouth 2 (Two) Times a Day.      Multiple Vitamins-Minerals (Multivitamin & Mineral) liquid Take  by mouth.      pantoprazole (PROTONIX) 40 MG EC tablet Take 1 tablet by mouth Daily.      Wheat Dextrin (Benefiber Drink Mix) pack Take  by mouth.      aprepitant (EMEND) 40 MG capsule TAKE BEFORE WALKING INTO THE SURGERY CENTER (Patient not taking: Reported on 9/28/2023)      clobetasol (TEMOVATE) 0.05 % ointment Apply 1 application  topically to the appropriate area as directed 2 (Two) Times a Day. (Patient not taking: Reported on 9/28/2023)      dexAMETHasone (DECADRON) 4 MG tablet TAKE 1 TABLET BY MOUTH TWICE A DAY STARTING AFTER SURGERY (Patient not taking: Reported on 9/28/2023)      diclofenac (VOLTAREN) 50 MG EC tablet Take 1 tablet by mouth 3 (Three) Times a Day As Needed (pain). (Patient not taking: Reported on 9/28/2023) 30 tablet 0    estradiol (ESTRACE VAGINAL) 0.1 MG/GM vaginal cream Place 1gm PV and massage 1gm to vulva and vestibule at night 2x/week (Patient not taking: Reported  "on 9/28/2023) 42.5 g 3    HYDROcodone-acetaminophen (NORCO) 5-325 MG per tablet take 1-2 tablets EVERY 4 TO 6 HOURS AS NEEDED FOR PAIN (Patient not taking: Reported on 9/28/2023)      methocarbamol (ROBAXIN) 500 MG tablet Take 1 tablet by mouth 4 (Four) Times a Day As Needed for Muscle Spasms. (Patient not taking: Reported on 9/28/2023) 30 tablet 0    ondansetron ODT (ZOFRAN-ODT) 4 MG disintegrating tablet DISSOLVE 1 TABLET UNDER THE TONGUE BY MOUTH EVERY 4 TO 6 HOURS AS NEEDED AFTER SURGERY (Patient not taking: Reported on 9/28/2023)       No current facility-administered medications on file prior to visit.        Health Maintenance Due   Topic Date Due    COLORECTAL CANCER SCREENING  Never done    ZOSTER VACCINE (1 of 2) Never done    HEPATITIS C SCREENING  Never done    ANNUAL PHYSICAL  Never done       Vital Signs:   Vitals:    09/28/23 1113   BP: 122/80   Pulse: 81   Resp: 14   Temp: 98 øF (36.7 øC)   SpO2: 98%   Weight: 99.3 kg (219 lb)   Height: 157.5 cm (62\")      Body mass index is 40.06 kg/mý.     ROS:  Review of Systems   Constitutional:  Negative for unexpected weight loss.   Cardiovascular:  Negative for chest pain.   Gastrointestinal:  Negative for abdominal pain.   Genitourinary:  Negative for dysuria and urinary incontinence.   Musculoskeletal:  Positive for back pain.          Physical Exam  Vitals reviewed.   Constitutional:       Appearance: Normal appearance.   HENT:      Head:        Right Ear: Tympanic membrane normal.      Left Ear: Tympanic membrane normal.      Nose: Nose normal.   Eyes:      Extraocular Movements: Extraocular movements intact.      Conjunctiva/sclera: Conjunctivae normal.      Pupils: Pupils are equal, round, and reactive to light.   Cardiovascular:      Rate and Rhythm: Normal rate and regular rhythm.   Pulmonary:      Effort: Pulmonary effort is normal.      Breath sounds: Normal breath sounds.   Chest:       Abdominal:      General: Bowel sounds are normal. "   Musculoskeletal:         General: Normal range of motion.      Cervical back: Normal range of motion.   Skin:     General: Skin is warm and dry.   Neurological:      General: No focal deficit present.      Mental Status: She is alert and oriented to person, place, and time.   Psychiatric:         Mood and Affect: Mood normal.         Behavior: Behavior normal.          Result Review        Diagnoses and all orders for this visit:    1. Screen for colon cancer (Primary)  -     Amb Referral for Screening Colonoscopy    2. Encounter for screening mammogram for malignant neoplasm of breast    3. Slow transit constipation    4. Abnormal breast exam  -     Mammo Diagnostic Digital Tomosynthesis Bilateral With CAD; Future  -     US Breast Right Limited; Future    5. Skin lesion of face  -     Ambulatory Referral to Dermatology    Colon cancer screening  - A colonoscopy will be ordered.    Rash on right breast  - The patient will have a diagnostic mammogram and ultrasound.      Smoking Cessation:    Sonia Lima  reports that she quit smoking about 2 years ago. Her smoking use included cigarettes. She has a 17.50 pack-year smoking history. She has never used smokeless tobacco.          Follow Up   Return in about 6 months (around 3/28/2024).  Patient was given instructions and counseling regarding her condition or for health maintenance advice. Please see specific information pulled into the AVS if appropriate.       Emily Johns submitted by the patient for this visit:  Primary Reason for Visit (Submitted on 9/21/2023)  What is the primary reason for your visit?: Back Pain  Back Pain Questionnaire (Submitted on 9/21/2023)  Chief Complaint: Back pain  Chronicity: new  Onset: more than 1 month ago  Frequency: every several days  Progression since onset: gradually improving  Pain location: sacro-iliac  Pain quality: aching, burning  Radiates to: does not radiate  Pain - numeric: 4/10  Pain is:  worse during the night  Aggravated by: position  Stiffness is present: at night  headaches: No  leg pain: No  paresis: No  paresthesias: No  perianal numbness: No  tingling: No  Risk factors: menopause, obesity      Transcribed from ambient dictation for Hamida Urbina MD by Veronica Eduardo.  09/28/23   13:38 EDT    Patient or patient representative verbalized consent to the visit recording.  I have personally performed the services described in this document as transcribed by the above individual, and it is both accurate and complete.

## 2023-10-03 ENCOUNTER — HOSPITAL ENCOUNTER (OUTPATIENT)
Dept: ULTRASOUND IMAGING | Facility: HOSPITAL | Age: 53
Discharge: HOME | End: 2023-10-03
Payer: MEDICAID

## 2023-10-03 ENCOUNTER — HOSPITAL ENCOUNTER (OUTPATIENT)
Dept: MAMMOGRAPHY | Facility: HOSPITAL | Age: 53
Discharge: HOME | End: 2023-10-03
Payer: MEDICAID

## 2023-10-03 ENCOUNTER — TELEPHONE (OUTPATIENT)
Dept: FAMILY MEDICINE CLINIC | Facility: CLINIC | Age: 53
End: 2023-10-03

## 2023-10-03 DIAGNOSIS — N64.59 ABNORMAL BREAST EXAM: ICD-10-CM

## 2023-10-03 PROCEDURE — 76642 ULTRASOUND BREAST LIMITED: CPT

## 2023-10-03 PROCEDURE — 77066 DX MAMMO INCL CAD BI: CPT

## 2023-10-03 PROCEDURE — G0279 TOMOSYNTHESIS, MAMMO: HCPCS

## 2023-10-03 NOTE — TELEPHONE ENCOUNTER
Caller: Sonia Mathews    Relationship: Self    Best call back number: [phone]     What is the best time to reach you: ANYTIME     Who are you requesting to speak with (clinical staff, provider,  specific staff member): CLINICAL    PATIENT STATES SHE HAS HAD A VULVA ITCH AND HAS HAD CREAMS AND SPECIALISTS. PATIENT STATES SHE WANTS TO KNOW IF THERE IS A BIOPSY THAT CAN BE ORDERED FOR THE RASH ON HER BREAST AND THE VULVA.    PATIENT IS VERY FRUSTRATED AND FEELS SHE IS RUNNING OUT OF OPTIONS TO FIGURE OUT WHAT IS WRONG WITH HER. PATIENT IS ASKING FOR A CALL WITH A PLAN OF CARE.

## 2023-10-03 NOTE — PROGRESS NOTES
Please inform patient that her mammogram and ultrasound were both benign and normal findings.  Repeat in 1 year

## 2023-10-03 NOTE — TELEPHONE ENCOUNTER
Caller: Sonia Mathews    Relationship to patient: Self    Best call back number:     [phone] (Mobile)   OKAY TO LEAVE A VOICEMAIL     Patient is needing: PATIENT CALLED IN AND IS REQUESTING ANOTHER REFERRAL TO DERMATOLOGY AND SAID THAT [facility] WOULD ONLY TAKE A REFERRAL FROM [facility] AND PATIENT WOULD LIKE TO HAVE A NEW REFERRAL FOR DERMATOLOGY PLEASE

## 2023-10-04 ENCOUNTER — TELEPHONE (OUTPATIENT)
Dept: FAMILY MEDICINE CLINIC | Facility: CLINIC | Age: 53
End: 2023-10-04
Payer: MEDICAID

## 2023-11-02 ENCOUNTER — OFFICE VISIT (OUTPATIENT)
Dept: FAMILY MEDICINE CLINIC | Facility: CLINIC | Age: 53
End: 2023-11-02
Payer: MEDICAID

## 2023-11-02 VITALS
HEART RATE: 81 BPM | OXYGEN SATURATION: 98 % | TEMPERATURE: 98.4 F | DIASTOLIC BLOOD PRESSURE: 76 MMHG | WEIGHT: 212 LBS | SYSTOLIC BLOOD PRESSURE: 122 MMHG | HEIGHT: 62 IN | BODY MASS INDEX: 39.01 KG/M2

## 2023-11-02 DIAGNOSIS — R87.69: Primary | ICD-10-CM

## 2023-11-02 DIAGNOSIS — N64.4 BREAST PAIN, RIGHT: ICD-10-CM

## 2023-11-02 DIAGNOSIS — L29.3 PRURITUS GENITALIA: ICD-10-CM

## 2023-11-02 PROBLEM — L28.0 LICHEN SIMPLEX CHRONICUS: Status: RESOLVED | Noted: 2023-01-17 | Resolved: 2023-11-02

## 2023-11-02 PROCEDURE — 99214 OFFICE O/P EST MOD 30 MIN: CPT | Performed by: FAMILY MEDICINE

## 2023-11-02 PROCEDURE — 1159F MED LIST DOCD IN RCRD: CPT | Performed by: FAMILY MEDICINE

## 2023-11-02 PROCEDURE — 1160F RVW MEDS BY RX/DR IN RCRD: CPT | Performed by: FAMILY MEDICINE

## 2023-11-02 RX ORDER — DIPHENHYDRAMINE HCL 25 MG
25 CAPSULE ORAL 2 TIMES DAILY
COMMUNITY

## 2023-11-02 NOTE — PROGRESS NOTES
Chief Complaint  Chief Complaint   Patient presents with    Breast itching       HPI:  Sonia Lima presents to [facility]    The patient continues to experience pruritus in her right breast. Her mammogram and ultrasound were negative. The pruritus is in one spot that goes up to the areola, but does not reach the nipple.    The patient has a rash on her vulva which causes pruritus mostly on the right side, but now she can feel it on the left side as well as around her clitoris. If she scratches the area, it causes urinary leakage. She performs Kegel exercises. She has an appointment with Dr. Kaushal Quiros, gynecology/oncology, at [facility] [place] on 2023. Previously, she tested negative for herpes. A 5 mm punch biopsy was performed, and a precancerous lesion was found. Lichen sclerosis was ruled out. The patient has tried applying Aquaphor and hydrocortisone cream to the area, and she takes Benadryl at night and in the morning. She has also tried chamomile ointment which did not work.    There is a family history of breast cancer.    Procedures     Past History:  Medical History: has a past medical history of Cataract (2014), Detached retina, and GERD (gastroesophageal reflux disease).   Surgical History: has a past surgical history that includes Hysterectomy; Knee surgery; Wrist surgery (Right);  section; Trigger finger release; Eye surgery (? ); Subtotal Hysterectomy (); and GASTRIC SLEEVE WITH VENTRAL HERNIA REPAIR.   Family History: family history includes Alcohol abuse in her brother and brother; COPD in her brother and father; Cancer in her brother, maternal grandmother, and mother; Diabetes in her sister; Drug abuse in her brother and brother; Heart disease in her father; Hyperlipidemia in her father; Other in her maternal grandfather; Vision loss in her brother.   Social History: reports that she quit smoking  "about 2 years ago. Her smoking use included cigarettes. She has a 17.50 pack-year smoking history. She has never used smokeless tobacco. She reports current alcohol use. She reports that she does not use drugs.  Immunization History   Administered Date(s) Administered    COVID-19 (PFIZER) Purple Cap Monovalent 04/22/2021, 05/13/2021    COVID-19 (UNSPECIFIED) 09/21/2023    Fluzone (or Fluarix & Flulaval for VFC) >6mos 10/20/2021    Fluzone Quad >6mos (Multi-dose) 10/12/2020    Influenza, Unspecified 09/21/2023    Tdap 06/26/2018         Allergies: Nystatin and Penicillins     Medications:  Current Outpatient Medications on File Prior to Visit   Medication Sig Dispense Refill    Biotin 71364 MCG tablet Take  by mouth.      Calcium Citrate-Vitamin D (CALCIUM CITRATE + PO) Take 600 mg by mouth 2 (Two) Times a Day.      diphenhydrAMINE (BENADRYL) 25 mg capsule Take 1 capsule by mouth 2 (Two) Times a Day.      Multiple Vitamins-Minerals (Multivitamin & Mineral) liquid Take  by mouth.      Wheat Dextrin (Benefiber Drink Mix) pack Take  by mouth.       No current facility-administered medications on file prior to visit.        Health Maintenance Due   Topic Date Due    COLORECTAL CANCER SCREENING  Never done    ZOSTER VACCINE (1 of 2) Never done    HEPATITIS C SCREENING  Never done    ANNUAL PHYSICAL  Never done       Vital Signs:   Vitals:    11/02/23 1344   BP: 122/76   Pulse: 81   Temp: 98.4 °F (36.9 °C)   SpO2: 98%   Weight: 96.2 kg (212 lb)   Height: 157.5 cm (62\")      Body mass index is 38.78 kg/m².     ROS:  Review of Systems   Constitutional:  Negative for fatigue and fever.   HENT:  Negative for congestion, ear pain and sinus pressure.    Respiratory:  Negative for cough, chest tightness and shortness of breath.    Cardiovascular:  Negative for chest pain, palpitations and leg swelling.   Gastrointestinal:  Negative for abdominal pain and diarrhea.   Genitourinary:  Negative for dysuria and frequency. "   Neurological:  Negative for speech difficulty, headache and confusion.   Psychiatric/Behavioral:  Negative for agitation and behavioral problems.           Physical Exam  Vitals reviewed.   Constitutional:       Appearance: Normal appearance.   HENT:      Right Ear: Tympanic membrane normal.      Left Ear: Tympanic membrane normal.      Nose: Nose normal.   Eyes:      Extraocular Movements: Extraocular movements intact.      Conjunctiva/sclera: Conjunctivae normal.      Pupils: Pupils are equal, round, and reactive to light.   Cardiovascular:      Rate and Rhythm: Normal rate and regular rhythm.   Pulmonary:      Effort: Pulmonary effort is normal.      Breath sounds: Normal breath sounds.   Abdominal:      General: Bowel sounds are normal.   Musculoskeletal:         General: Normal range of motion.      Cervical back: Normal range of motion.   Skin:     General: Skin is warm and dry.   Neurological:      General: No focal deficit present.      Mental Status: She is alert and oriented to person, place, and time.   Psychiatric:         Mood and Affect: Mood normal.         Behavior: Behavior normal.          Result Review   Mammo Diagnostic Digital Tomosynthesis Bilateral With CAD (10/03/2023 13:50)      US Breast Right Limited (10/03/2023 14:07)     Diagnoses and all orders for this visit:    1. 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 Pruritus genitalia  -     diphenhydrAMINE (BENADRYL) 2 % cream; Apply 1 application  topically to the appropriate area as directed 3 (Three) Times a Day As Needed for Itching for up to 30 days.  Dispense: 30 g; Refill: 1    3. Breast pain, right  -     MRI Breast Right With & Without Contrast; Future      1. Vaginal lesion with pruritus  - Prescribed Benadryl cream  - She will see Dr. Kaushal Quiros on 11/06/2023.    2. Pruritus of the right breast  - Suggest an MRI of the breast.    Smoking Cessation:    Sonia Lima  reports that  she quit smoking about 2 years ago. Her smoking use included cigarettes. She has a 17.50 pack-year smoking history. She has never used smokeless tobacco.          Follow Up   Return in about 3 months (around 2/2/2024).  Patient was given instructions and counseling regarding her condition or for health maintenance advice. Please see specific information pulled into the AVS if appropriate.       Hamida Urbina MD  Answers submitted by the patient for this visit:  Other (Submitted on 10/27/2023)  Please describe your symptoms.: Follow up for rash on breast  Have you had these symptoms before?: Yes  How long have you been having these symptoms?: Greater than 2 weeks  Please list any medications you are currently taking for this condition.: Multivitamin, biotin, calcium,  Primary Reason for Visit (Submitted on 10/27/2023)  What is the primary reason for your visit?: Other    Transcribed from ambient dictation for Hamida Urbina MD by Asia Alberts.  11/02/23   19:51 EDT    Patient or patient representative verbalized consent to the visit recording.  I have personally performed the services described in this document as transcribed by the above individual, and it is both accurate and complete.

## 2023-12-19 ENCOUNTER — TELEPHONE (OUTPATIENT)
Dept: FAMILY MEDICINE CLINIC | Facility: CLINIC | Age: 53
End: 2023-12-19

## 2023-12-19 NOTE — TELEPHONE ENCOUNTER
Caller: Sonia Mathews    Relationship: Self    Best call back number:[phone]      What was the call regarding: PATIENT CALLED FOLLOWING UP ON MRI ISSUE/  PLEASE ADVISE.

## 2023-12-29 ENCOUNTER — OFFICE VISIT (OUTPATIENT)
Dept: FAMILY MEDICINE CLINIC | Facility: CLINIC | Age: 53
End: 2023-12-29
Payer: MEDICAID

## 2023-12-29 VITALS
WEIGHT: 207.4 LBS | RESPIRATION RATE: 14 BRPM | HEIGHT: 62 IN | HEART RATE: 99 BPM | BODY MASS INDEX: 38.16 KG/M2 | SYSTOLIC BLOOD PRESSURE: 110 MMHG | TEMPERATURE: 97.7 F | OXYGEN SATURATION: 99 % | DIASTOLIC BLOOD PRESSURE: 78 MMHG

## 2023-12-29 DIAGNOSIS — J01.00 SUBACUTE MAXILLARY SINUSITIS: ICD-10-CM

## 2023-12-29 DIAGNOSIS — N64.4 BREAST PAIN, RIGHT: ICD-10-CM

## 2023-12-29 DIAGNOSIS — Z00.00 ANNUAL PHYSICAL EXAM: ICD-10-CM

## 2023-12-29 DIAGNOSIS — R09.81 SINUS CONGESTION: Primary | ICD-10-CM

## 2023-12-29 LAB
EXPIRATION DATE: NORMAL
FLUAV AG UPPER RESP QL IA.RAPID: NOT DETECTED
FLUBV AG UPPER RESP QL IA.RAPID: NOT DETECTED
INTERNAL CONTROL: NORMAL
Lab: NORMAL
SARS-COV-2 AG UPPER RESP QL IA.RAPID: NOT DETECTED

## 2023-12-29 RX ORDER — LORATADINE 10 MG/1
10 TABLET ORAL DAILY
COMMUNITY
Start: 2023-11-06

## 2023-12-29 RX ORDER — IMIQUIMOD 12.5 MG/.25G
CREAM TOPICAL 3 TIMES WEEKLY
COMMUNITY
Start: 2023-12-08

## 2023-12-29 RX ORDER — TRIAMCINOLONE ACETONIDE 1 MG/G
CREAM TOPICAL
COMMUNITY
Start: 2023-12-06

## 2023-12-29 RX ORDER — HYDROXYZINE HYDROCHLORIDE 25 MG/1
25 TABLET, FILM COATED ORAL
COMMUNITY
Start: 2023-11-09

## 2023-12-29 RX ORDER — AZITHROMYCIN 250 MG/1
TABLET, FILM COATED ORAL
Qty: 6 TABLET | Refills: 0 | Status: SHIPPED | OUTPATIENT
Start: 2023-12-29

## 2023-12-29 NOTE — PROGRESS NOTES
"Chief Complaint  Chief Complaint   Patient presents with    Rash    Annual Exam       HPI:  Sonia Lima presents to [facility]    Sonia Lima presents for evaluation of multiple medical concerns.    She has a head cold, runny nose, and \"fishbowl syndrome\" in her head. She has congestion and coughs up phlegm, which she thinks is due to postnasal drip. She periodically has a dry cough. It starts out as a little scratch and then she coughs up phlegm. She had a frontal headache on 12/18/2023, in the sinus area. She has Theraflu, Benadryl, and hydroxyzine. She was told to rotate her medications if she needed to see if it worked better at different times of the day. Her symptoms are improving, but not gone.    She has a circular rash on her right breast. She called about the MRI because she had not seen anything on her chart. She was told that they had the standing order in there, but they needed something else, so they sent it back to the office. When she talked to them right before [place], they said they were still waiting. She called back the hospital in [facility] and was told that they would send it over in a couple of hours and check back.     She went to the dermatologist for a spot on her forehead. He did a punch biopsy and put a suture in it. She was told that it was psoriasis, but eczema. Dr. Nunes, the dermatology oncologist, sent her for the vein issue, who does not think it is eczema because eczema does not form circular rashes. She was told to go ahead and try the cream that he prescribed. If it does not help, then she will have to have further tests run. Dr. Nunes is dealing with the vein area and that is her primary focus right now to get it under control. She had a mole removed, which was a hemangioma. It would get red, purple, and sore because it was pumping more blood in there. It has healed up, but she still has the rash even using the cream. The " Aldara cream sanderson when she first puts it on. It seems to magnify the itch and makes it sore. In between her day dosage days, she takes a little bit of Vaseline and smooths it over the skin to help protect it. It feels like a road rash. It is very sensitive. It starts to ease up. She uses the cream 3 times a week. She was told to wear old underwear to keep the area moist.    She had some depression through the holidays. Two weeks before [place], she would go somewhere and walk in and out of every store. She could not stay in the house. She had to do something that redirected her brain. It gave her energy to get around people.    She had genetic cancer testing done. 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 She was told that it puts her at a slightly increased risk of brain cancer. She is being sent to a neurology oncologist. She has an appointment on 2024.    She is no longer seeing Dr. Clemens. She has an appointment with Dr. Hart in 2024.    Pt was counseled on healthy eating habits to maintain good balanced nutrition and healthy weight.  Pt was counseled on increasing physical activity.  Pt was advised to practice safe sexual activities. Discouraged from the misuse of alcohol, tobacco and drugs.        Procedures     Past History:  Medical History: has a past medical history of Cataract (2014), Detached retina, and GERD (gastroesophageal reflux disease).   Surgical History: has a past surgical history that includes Hysterectomy; Knee surgery; Wrist surgery (Right);  section; Trigger finger release; Eye surgery (? ); Subtotal Hysterectomy (); and GASTRIC SLEEVE WITH VENTRAL HERNIA REPAIR.   Family History: family history includes Alcohol abuse in her brother and brother; COPD in her brother and father; Cancer in her brother, maternal grandmother, and mother; Diabetes in her sister; Drug abuse in her brother and brother; Heart disease in her father; Hyperlipidemia in her  father; Other in her maternal grandfather; Vision loss in her brother.   Social History: reports that she quit smoking about 2 years ago. Her smoking use included cigarettes. She has a 17.50 pack-year smoking history. She has never used smokeless tobacco. She reports current alcohol use. She reports that she does not use drugs.  Immunization History   Administered Date(s) Administered    COVID-19 (PFIZER) Purple Cap Monovalent 04/22/2021, 05/13/2021    COVID-19 (UNSPECIFIED) 09/21/2023    Fluzone (or Fluarix & Flulaval for VFC) >6mos 10/20/2021    Fluzone Quad >6mos (Multi-dose) 10/12/2020    Influenza, Unspecified 09/21/2023    Tdap 06/26/2018         Allergies: Ketoconazole, Nystatin, and Penicillins     Medications:  Current Outpatient Medications on File Prior to Visit   Medication Sig Dispense Refill    Biotin 52824 MCG tablet Take  by mouth.      Calcium Citrate-Vitamin D (CALCIUM CITRATE + PO) Take 600 mg by mouth 2 (Two) Times a Day.      diphenhydrAMINE (BENADRYL) 25 mg capsule Take 1 capsule by mouth 2 (Two) Times a Day.      hydrOXYzine (ATARAX) 25 MG tablet Take 1 tablet by mouth.      imiquimod (ALDARA) 5 % cream Apply  topically to the appropriate area as directed 3 (Three) Times a Week.      loratadine (CLARITIN) 10 MG tablet Take 1 tablet by mouth Daily.      Multiple Vitamins-Minerals (Multivitamin & Mineral) liquid Take  by mouth.      triamcinolone (KENALOG) 0.1 % cream APPLY TOPICALLY TO AFFECTED AREA ON RIGHT BREAST TWICE DAILY      Wheat Dextrin (Benefiber Drink Mix) pack Take  by mouth.       No current facility-administered medications on file prior to visit.        Health Maintenance Due   Topic Date Due    COLORECTAL CANCER SCREENING  Never done    ZOSTER VACCINE (1 of 2) Never done    HEPATITIS C SCREENING  Never done    ANNUAL PHYSICAL  Never done    COVID-19 Vaccine (4 - 2023-24 season) 11/16/2023       Vital Signs:   Vitals:    12/29/23 0948   BP: 110/78   Pulse: 99   Resp: 14   Temp:  "97.7 °F (36.5 °C)   SpO2: 99%   Weight: 94.1 kg (207 lb 6.4 oz)   Height: 157.5 cm (62\")      Body mass index is 37.93 kg/m².     ROS:  Review of Systems   Constitutional:  Negative for fatigue and fever.   HENT:  Negative for congestion, ear pain and sinus pressure.    Respiratory:  Negative for cough, chest tightness and shortness of breath.    Cardiovascular:  Negative for chest pain, palpitations and leg swelling.   Gastrointestinal:  Negative for abdominal pain and diarrhea.   Genitourinary:  Positive for vaginal pain. Negative for dysuria and frequency.   Skin:  Positive for rash.   Neurological:  Negative for speech difficulty, headache and confusion.   Psychiatric/Behavioral:  Negative for agitation and behavioral problems.           Physical Exam  Vitals reviewed.   Constitutional:       Appearance: Normal appearance.   HENT:      Right Ear: Tympanic membrane normal.      Left Ear: Tympanic membrane normal.      Nose: Nose normal.   Eyes:      Extraocular Movements: Extraocular movements intact.      Conjunctiva/sclera: Conjunctivae normal.      Pupils: Pupils are equal, round, and reactive to light.   Cardiovascular:      Rate and Rhythm: Normal rate and regular rhythm.   Pulmonary:      Effort: Pulmonary effort is normal.      Breath sounds: Normal breath sounds.   Abdominal:      General: Bowel sounds are normal.   Musculoskeletal:         General: Normal range of motion.      Cervical back: Normal range of motion.   Skin:     General: Skin is warm and dry.   Neurological:      General: No focal deficit present.      Mental Status: She is alert and oriented to person, place, and time.   Psychiatric:         Mood and Affect: Mood normal.         Behavior: Behavior normal.          Result Review        Diagnoses and all orders for this visit:    1. Sinus congestion (Primary)  -     POCT SARS-CoV-2 Antigen KRISTA + Flu    2. Breast pain, right  -     MRI Breast Bilateral With & Without Contrast; Future    3. " Annual physical exam    4. Subacute maxillary sinusitis  -     azithromycin (Zithromax Z-Sixto) 250 MG tablet; Take 2 tablets by mouth on day 1, then 1 tablet daily on days 2-5  Dispense: 6 tablet; Refill: 0      1. Circular rash.  - I will give her lidocaine gel sample.    2. Cold and cough.  - Her oxygen level is good.   - I will prescribe her an antibiotic.   - I will give her samples of Mucinex.    Follow-up  The patient will follow up in 3 months.          Smoking Cessation:    Sonia Lima  reports that she quit smoking about 2 years ago. Her smoking use included cigarettes. She has a 17.50 pack-year smoking history. She has never used smokeless tobacco.     Follow Up   Return in about 3 months (around 3/29/2024).  Patient was given instructions and counseling regarding her condition or for health maintenance advice. Please see specific information pulled into the AVS if appropriate.       Hamida Urbina MD  Answers submitted by the patient for this visit:  Other (Submitted on 12/27/2023)  Please describe your symptoms.: Follow up and right breast rash  Have you had these symptoms before?: Yes  How long have you been having these symptoms?: Greater than 2 weeks  Please list any medications you are currently taking for this condition.: Triamcinolone 0.1% cream  Primary Reason for Visit (Submitted on 12/27/2023)  What is the primary reason for your visit?: Other      Transcribed from ambient dictation for Hamida Urbina MD by Dian Roldan.  12/29/23   11:19 EST    Patient or patient representative verbalized consent to the visit recording.  I have personally performed the services described in this document as transcribed by the above individual, and it is both accurate and complete.

## 2024-01-16 ENCOUNTER — TELEPHONE (OUTPATIENT)
Dept: FAMILY MEDICINE CLINIC | Facility: CLINIC | Age: 54
End: 2024-01-16
Payer: MEDICAID

## 2024-03-29 ENCOUNTER — OFFICE VISIT (OUTPATIENT)
Dept: FAMILY MEDICINE CLINIC | Facility: CLINIC | Age: 54
End: 2024-03-29
Payer: MEDICAID

## 2024-03-29 VITALS
SYSTOLIC BLOOD PRESSURE: 122 MMHG | HEIGHT: 62 IN | BODY MASS INDEX: 37.39 KG/M2 | HEART RATE: 85 BPM | DIASTOLIC BLOOD PRESSURE: 72 MMHG | OXYGEN SATURATION: 97 % | TEMPERATURE: 98.2 F | RESPIRATION RATE: 18 BRPM | WEIGHT: 203.2 LBS

## 2024-03-29 DIAGNOSIS — D36.10 SCHWANNOMA: ICD-10-CM

## 2024-03-29 DIAGNOSIS — Z13.220 SCREENING CHOLESTEROL LEVEL: ICD-10-CM

## 2024-03-29 DIAGNOSIS — G89.29 CHRONIC BILATERAL LOW BACK PAIN, UNSPECIFIED WHETHER SCIATICA PRESENT: Primary | ICD-10-CM

## 2024-03-29 DIAGNOSIS — M54.50 CHRONIC BILATERAL LOW BACK PAIN, UNSPECIFIED WHETHER SCIATICA PRESENT: Primary | ICD-10-CM

## 2024-03-29 DIAGNOSIS — E55.9 VITAMIN D DEFICIENCY: ICD-10-CM

## 2024-03-29 PROCEDURE — 99214 OFFICE O/P EST MOD 30 MIN: CPT | Performed by: FAMILY MEDICINE

## 2024-03-29 RX ORDER — CYCLOBENZAPRINE HCL 10 MG
1 TABLET ORAL 3 TIMES DAILY
COMMUNITY
Start: 2024-03-26

## 2024-03-29 RX ORDER — FLUOROURACIL 50 MG/G
1 CREAM TOPICAL 2 TIMES DAILY
COMMUNITY
Start: 2024-03-06

## 2024-03-29 RX ORDER — IBUPROFEN 800 MG/1
1 TABLET ORAL 3 TIMES DAILY
COMMUNITY
Start: 2024-03-25

## 2024-03-29 RX ORDER — KETOCONAZOLE 20 MG/G
1 CREAM TOPICAL DAILY
COMMUNITY
Start: 2023-11-09 | End: 2024-03-29

## 2024-03-29 NOTE — PROGRESS NOTES
Chief Complaint  Chief Complaint   Patient presents with    Follow-up       HPI:  Sonia Lima presents to [facility]      She is scheduled to see Dr. Marc on 05/06/2024. Dr. Marc put her on fluorouracil chemo cream twice daily for approximately 2 weeks. She used Aldara for approximately 3 months, which has resolved it. She had the same side effects as the Aldara. It was a quicker response time before the soreness started; however, she denies experiencing itching. She has a follow-up appointment with Dr. Quiros in 04/2024. Dr. Quiros ordered cancer genetic testing, and LCTR was found. They did a 3-hour head to hip MRI, which showed no schwannoma; however, there was a small dot behind the ear. They were intensively searching nerve fibers for any markers. She was told that one of the spots that schwannoma tends to show up is in the head and neck. Dr. Quiros was concerned that she had cataract surgery and detached retina. They found a small pin dot in the nerve fiber of the right ear; however, it was so small that they were unable to determine if it is a schwannoma or if it is something forming. She was told that there is no way to test it to determine anything due to it being too early. She will have another scan in approximately 6 months; however, if there are no changes, they will do it annually. The pinching in the neck is more on the right side. She has back pain on the right side, neck, and right shoulder pain. She is aware that she does not have any tumors or anything occurring that caused her neck pain. 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 She is seeing pain management. When she  her ribs, ibuprofen took the inflammation down. She takes ibuprofen and muscle relaxer on the days she experiences severe pain. She cleans the garage and sorted bolts. She is constantly moving for a full  day. She went to physical therapy. They are going to try an injection to numb the nerve fiber in the back. If that works after approximately 2 to 3 injections, they will do the nerve ablation.  She has a repeat MRI scheduled for 2024.     Her weight is slowing down. She had a gastric sleeve completed. She attempts to focus on getting protein in her diet. She drinks her water and takes her vitamins. She feels well overall. She denies any indigestion. She denies experiencing acid reflux like she had.     She has an appointment for her colonoscopy. She has a urology appointment for 2024.    Procedures     Past History:  Medical History: has a past medical history of Cataract (2014), Detached retina, and GERD (gastroesophageal reflux disease).   Surgical History: has a past surgical history that includes Hysterectomy; Knee surgery; Wrist surgery (Right);  section; Trigger finger release; Eye surgery (? ); Subtotal Hysterectomy (); GASTRIC SLEEVE WITH VENTRAL HERNIA REPAIR; Bariatric Surgery (2023); and Hernia repair (2023).   Family History: family history includes Alcohol abuse in her brother and brother; COPD in her brother and father; Cancer in her brother, maternal grandmother, and mother; Diabetes in her sister; Drug abuse in her brother and brother; Heart disease in her father; Hyperlipidemia in her father; Other in her maternal grandfather; Vision loss in her brother.   Social History: reports that she quit smoking about 2 years ago. Her smoking use included cigarettes. She started smoking about 37 years ago. She has a 17.5 pack-year smoking history. She has never used smokeless tobacco. She reports that she does not currently use alcohol. She reports that she does not use drugs.  Immunization History   Administered Date(s) Administered    COVID-19 (PFIZER) Purple Cap Monovalent 2021, 2021    COVID-19 (UNSPECIFIED) 2023    Fluzone (or Fluarix &  "Flulaval for VFC) >6mos 10/20/2021    Fluzone Quad >6mos (Multi-dose) 10/12/2020    Influenza, Unspecified 09/21/2023    Tdap 06/26/2018         Allergies: Ketoconazole, Nystatin, and Penicillins     Medications:  Current Outpatient Medications on File Prior to Visit   Medication Sig Dispense Refill    Biotin 42505 MCG tablet Take  by mouth.      Calcium Citrate-Vitamin D (CALCIUM CITRATE + PO) Take 600 mg by mouth 2 (Two) Times a Day.      cyclobenzaprine (FLEXERIL) 10 MG tablet Take 1 tablet by mouth 3 times a day.      diphenhydrAMINE (BENADRYL) 25 mg capsule Take 1 capsule by mouth 2 (Two) Times a Day.      fluorouracil (EFUDEX) 5 % cream Apply 1 Application topically to the appropriate area as directed 2 (Two) Times a Day.      hydrOXYzine (ATARAX) 25 MG tablet Take 1 tablet by mouth.      ibuprofen (ADVIL,MOTRIN) 800 MG tablet Take 1 tablet by mouth 3 times a day.      loratadine (CLARITIN) 10 MG tablet Take 1 tablet by mouth Daily.      Multiple Vitamins-Minerals (Multivitamin & Mineral) liquid Take  by mouth.      multivitamin with minerals (MULTIVITAMIN ADULT PO) Take 1 tablet by mouth Daily.      Wheat Dextrin (Benefiber Drink Mix) pack Take  by mouth.      azithromycin (Zithromax Z-Sixto) 250 MG tablet Take 2 tablets by mouth on day 1, then 1 tablet daily on days 2-5 (Patient not taking: Reported on 3/29/2024) 6 tablet 0     No current facility-administered medications on file prior to visit.        Health Maintenance Due   Topic Date Due    COLORECTAL CANCER SCREENING  Never done    ZOSTER VACCINE (1 of 2) Never done    HEPATITIS C SCREENING  Never done    COVID-19 Vaccine (4 - 2023-24 season) 11/16/2023       Vital Signs:   Vitals:    03/29/24 0828   BP: 122/72   BP Location: Left arm   Patient Position: Sitting   Cuff Size: Adult   Pulse: 85   Resp: 18   Temp: 98.2 °F (36.8 °C)   TempSrc: Temporal   SpO2: 97%   Weight: 92.2 kg (203 lb 3.2 oz)   Height: 157.5 cm (62\")      Body mass index is 37.17 kg/m². "     ROS:  Review of Systems   Constitutional:  Negative for fatigue and fever.   HENT:  Negative for congestion, ear pain and sinus pressure.    Respiratory:  Negative for cough, chest tightness and shortness of breath.    Cardiovascular:  Negative for chest pain, palpitations and leg swelling.   Gastrointestinal:  Negative for abdominal pain and diarrhea.   Genitourinary:  Negative for dysuria and frequency.   Neurological:  Negative for speech difficulty, headache and confusion.   Psychiatric/Behavioral:  Negative for agitation and behavioral problems.           Physical Exam  Vitals reviewed.   Constitutional:       Appearance: Normal appearance. She is well-developed.   HENT:      Head: Normocephalic and atraumatic.      Right Ear: Tympanic membrane normal.      Left Ear: Tympanic membrane normal.      Nose: Nose normal.   Eyes:      General: Lids are normal.      Extraocular Movements: Extraocular movements intact.      Conjunctiva/sclera: Conjunctivae normal.      Pupils: Pupils are equal, round, and reactive to light.   Neck:      Thyroid: No thyromegaly.      Trachea: Trachea normal.   Cardiovascular:      Rate and Rhythm: Normal rate and regular rhythm.      Heart sounds: Normal heart sounds.   Pulmonary:      Effort: Pulmonary effort is normal. No respiratory distress.      Breath sounds: Normal breath sounds.   Abdominal:      General: Bowel sounds are normal.   Musculoskeletal:         General: Normal range of motion.      Cervical back: Normal range of motion.   Skin:     General: Skin is warm and dry.   Neurological:      General: No focal deficit present.      Mental Status: She is alert and oriented to person, place, and time.      GCS: GCS eye subscore is 4. GCS verbal subscore is 5. GCS motor subscore is 6.   Psychiatric:         Attention and Perception: Attention normal.         Mood and Affect: Mood normal.         Speech: Speech normal.         Behavior: Behavior normal. Behavior is  cooperative.         Thought Content: Thought content normal.          Result Review   MRI Brain [facility] & [facility] Con (02/09/2024 14:11)      Diagnoses and all orders for this visit:    1. Chronic bilateral low back pain, unspecified whether sciatica present (Primary)  -     Comprehensive Metabolic Panel    2. Vitamin D deficiency  -     Vitamin D,25-Hydroxy    3. Screening cholesterol level  -     Lipid Panel    4. Schwannoma      1. Schwannoma.  She is scheduled for a repeat brain MRI on 08/12/2024.    2. Back pain.  Her last bone density showed thinning of her spine. Her bilateral hips and bilateral thighs were normal density. She was advised to take ibuprofen as needed. She was advised to take calcium and vitamin D daily. I will check her vitamin D levels.    3. Weight loss.  She was encouraged to increase her exercise or change her diet.    4. Health maintenance.   She has a colonoscopy scheduled for 05/14/2024.          Smoking Cessation:    Sonia Lima  reports that she quit smoking about 2 years ago. Her smoking use included cigarettes. She started smoking about 37 years ago. She has a 17.5 pack-year smoking history. She has never used smokeless tobacco.        Follow Up   Return in about 6 months (around 9/29/2024).  Patient was given instructions and counseling regarding her condition or for health maintenance advice. Please see specific information pulled into the AVS if appropriate.       Hamida Urbina MD    Transcribed from ambient dictation for Hamida Urbina MD by Ayse Smiley.  03/29/24   09:54 EDT    Patient or patient representative verbalized consent to the visit recording.  I have personally performed the services described in this document as transcribed by the above individual, and it is both accurate and complete.

## 2024-05-14 ENCOUNTER — OFFICE VISIT (OUTPATIENT)
Dept: SURGERY | Facility: CLINIC | Age: 54
End: 2024-05-14
Payer: MEDICAID

## 2024-05-14 ENCOUNTER — PREP FOR SURGERY (OUTPATIENT)
Dept: OTHER | Facility: HOSPITAL | Age: 54
End: 2024-05-14
Payer: MEDICAID

## 2024-05-14 VITALS
DIASTOLIC BLOOD PRESSURE: 71 MMHG | SYSTOLIC BLOOD PRESSURE: 122 MMHG | WEIGHT: 204 LBS | HEART RATE: 78 BPM | BODY MASS INDEX: 37.54 KG/M2 | HEIGHT: 62 IN

## 2024-05-14 DIAGNOSIS — Z80.0 FAMILY HISTORY OF COLON CANCER: ICD-10-CM

## 2024-05-14 DIAGNOSIS — Z12.11 SCREENING FOR MALIGNANT NEOPLASM OF COLON: Primary | ICD-10-CM

## 2024-05-14 RX ORDER — SOD SULF/POT CHLORIDE/MAG SULF 1.479 G
12 TABLET ORAL TAKE AS DIRECTED
Qty: 24 TABLET | Refills: 0 | COMMUNITY
Start: 2024-05-14

## 2024-05-14 RX ORDER — SODIUM CHLORIDE 9 MG/ML
40 INJECTION, SOLUTION INTRAVENOUS AS NEEDED
OUTPATIENT
Start: 2024-05-14

## 2024-05-14 RX ORDER — SODIUM CHLORIDE 0.9 % (FLUSH) 0.9 %
3 SYRINGE (ML) INJECTION EVERY 12 HOURS SCHEDULED
OUTPATIENT
Start: 2024-05-14

## 2024-05-14 RX ORDER — SODIUM CHLORIDE 0.9 % (FLUSH) 0.9 %
10 SYRINGE (ML) INJECTION AS NEEDED
OUTPATIENT
Start: 2024-05-14

## 2024-05-14 NOTE — PROGRESS NOTES
Chief Complaint: Colonoscopy (consult)    Subjective      Colonoscopy consultation         History of Present Illness  Sonia Lima is a 54 y.o. female presents to [facility] for colonoscopy consultation.     Patient presents today on referral from Dr. Hamida caldwell for colonoscopy consultation.  Patient denies any abdominal pain, change in bowel habit, or rectal bleeding.  Patient is high risk secondary to 1 brother that  at the age of 62 to rectal cancer, and another brother had a colon resection secondary to colon cancer. No previous colonoscopy.    Patient has a history of gastric sleeve.    Patient denies SHADIA.  Denies any cardiac issues.  Denies taking a GLP-1 receptors.      Objective     Past Medical History:   Diagnosis Date    Cataract 2014    Cataract surgery    Detached retina     GERD (gastroesophageal reflux disease)        Past Surgical History:   Procedure Laterality Date    BARIATRIC SURGERY  2023    [facility]     SECTION      EYE SURGERY  ?     Cataract, detached retina    GASTRIC SLEEVE WITH VENTRAL HERNIA REPAIR      HERNIA REPAIR  2023    HYSTERECTOMY      KNEE SURGERY      SUBTOTAL HYSTERECTOMY      TRIGGER FINGER RELEASE      WRIST SURGERY Right        Outpatient Medications Marked as Taking for the 24 encounter (Office Visit) with Snow Clarke APRN   Medication Sig Dispense Refill    Biotin 11062 MCG tablet Take  by mouth.      Calcium Citrate-Vitamin D (CALCIUM CITRATE + PO) Take 600 mg by mouth 2 (Two) Times a Day.      cyclobenzaprine (FLEXERIL) 10 MG tablet Take 1 tablet by mouth 3 times a day.      diphenhydrAMINE (BENADRYL) 25 mg capsule Take 1 capsule by mouth 2 (Two) Times a Day.      hydrOXYzine (ATARAX) 25 MG tablet Take 1 tablet by mouth.      ibuprofen (ADVIL,MOTRIN) 800 MG tablet Take 1 tablet by mouth 3 times a day.      loratadine (CLARITIN) 10 MG tablet Take 1 tablet by mouth Daily.       "multivitamin with minerals (MULTIVITAMIN ADULT PO) Take 1 tablet by mouth Daily.      Wheat Dextrin (Benefiber Drink Mix) pack Take  by mouth.         Allergies   Allergen Reactions    Ketoconazole Rash    Nystatin Hives     Patient states that nystatin cream by itself causes hives under her breasts but has been able to use creams that have nystatin in it with other things with no reactions    Penicillins Swelling        Family History   Problem Relation Age of Onset    Cancer Mother         Pre cancer right breast 86    COPD Father     Heart disease Father     Hyperlipidemia Father     Other Maternal Grandfather         ALS    Cancer Maternal Grandmother         Ovarian    Alcohol abuse Brother     Cancer Brother         Colon cancer    Drug abuse Brother         Alcohol    Alcohol abuse Brother     COPD Brother     Drug abuse Brother         Alcohol and drugs    Diabetes Sister     Vision loss Brother         Glaucoma       Social History     Socioeconomic History    Marital status:    Tobacco Use    Smoking status: Former     Current packs/day: 0.00     Average packs/day: 0.5 packs/day for 35.0 years (17.5 ttl pk-yrs)     Types: Cigarettes     Start date: 10/1/1986     Quit date: 10/1/2021     Years since quittin.6    Smokeless tobacco: Never   Vaping Use    Vaping status: Former   Substance and Sexual Activity    Alcohol use: Not Currently     Comment: SOCIAL    Drug use: Never    Sexual activity: Not Currently     Partners: Male     Birth control/protection: Hysterectomy       Review of Systems   Constitutional:  Negative for chills and fever.   Gastrointestinal:  Negative for abdominal distention, abdominal pain, anal bleeding, blood in stool, constipation, diarrhea and rectal pain.        Vital Signs:   /71 (BP Location: Right arm)   Pulse 78   Ht 157.5 cm (62\")   Wt 92.5 kg (204 lb)   BMI 37.31 kg/m²      Physical Exam  Vitals and nursing note reviewed.   Constitutional:       General: " She is not in acute distress.     Appearance: Normal appearance. She is not ill-appearing.   HENT:      Head: Normocephalic and atraumatic.   Cardiovascular:      Rate and Rhythm: Normal rate.   Pulmonary:      Effort: Pulmonary effort is normal.      Breath sounds: No stridor.   Abdominal:      Palpations: Abdomen is soft.      Tenderness: There is no guarding.   Musculoskeletal:         General: No deformity. Normal range of motion.   Skin:     General: Skin is warm and dry.      Coloration: Skin is not jaundiced.   Neurological:      General: No focal deficit present.      Mental Status: She is alert and oriented to person, place, and time.   Psychiatric:         Mood and Affect: Mood normal.         Thought Content: Thought content normal.          Result Review :          []  Laboratory  []  Radiology  []  Pathology  []  Microbiology  []  EKG/Telemetry   []  Cardiology/Vascular   []  Old records       Assessment and Plan    Diagnoses and all orders for this visit:    1. Screening for malignant neoplasm of colon (Primary)  -     Sodium Sulfate-Mag Sulfate-KCl (Sutab) 5590-920-710 MG tablet; Take 12 tablets by mouth Take As Directed. Indications: LOT: [id number] EXP:8/31/25  Dispense: 24 tablet; Refill: 0    2. Family history of colon cancer  -     Sodium Sulfate-Mag Sulfate-KCl (Sutab) 4984-053-935 MG tablet; Take 12 tablets by mouth Take As Directed. Indications: LOT: [id number] EXP:8/31/25  Dispense: 24 tablet; Refill: 0        Follow Up   Return for Schedule colonoscopy with Dr. Perez on 6/10/2024 [facility].    Hospital arrival time: 12:00.    Possible risks/complications, benefits, and alternatives to surgical or invasive procedures have been explained to patient and/or legal guardian.    Patient has been evaluated and can tolerate anesthesia and/or sedation. Risks, benefits, and alternatives to anesthesia and sedation have been explained to the patient and/or legal guardian. Patient verbalizes  understanding and is willing to proceed with the above plan.     Patient was given instructions and counseling regarding her condition or for health maintenance advice. Please see specific information pulled into the AVS if appropriate.

## 2024-05-14 NOTE — H&P (VIEW-ONLY)
Chief Complaint: Colonoscopy (consult)    Subjective      Colonoscopy consultation         History of Present Illness  Sonia Lima is a 54 y.o. female presents to [facility] for colonoscopy consultation.     Patient presents today on referral from Dr. Hamida caldwell for colonoscopy consultation.  Patient denies any abdominal pain, change in bowel habit, or rectal bleeding.  Patient is high risk secondary to 1 brother that  at the age of 62 to rectal cancer, and another brother had a colon resection secondary to colon cancer. No previous colonoscopy.    Patient has a history of gastric sleeve.    Patient denies SHADIA.  Denies any cardiac issues.  Denies taking a GLP-1 receptors.      Objective     Past Medical History:   Diagnosis Date    Cataract 2014    Cataract surgery    Detached retina     GERD (gastroesophageal reflux disease)        Past Surgical History:   Procedure Laterality Date    BARIATRIC SURGERY  2023    [facility]G     SECTION      EYE SURGERY  ?     Cataract, detached retina    GASTRIC SLEEVE WITH VENTRAL HERNIA REPAIR      HERNIA REPAIR  2023    HYSTERECTOMY      KNEE SURGERY      SUBTOTAL HYSTERECTOMY      TRIGGER FINGER RELEASE      WRIST SURGERY Right        Outpatient Medications Marked as Taking for the 24 encounter (Office Visit) with Snow Clarke APRN   Medication Sig Dispense Refill    Biotin 65850 MCG tablet Take  by mouth.      Calcium Citrate-Vitamin D (CALCIUM CITRATE + PO) Take 600 mg by mouth 2 (Two) Times a Day.      cyclobenzaprine (FLEXERIL) 10 MG tablet Take 1 tablet by mouth 3 times a day.      diphenhydrAMINE (BENADRYL) 25 mg capsule Take 1 capsule by mouth 2 (Two) Times a Day.      hydrOXYzine (ATARAX) 25 MG tablet Take 1 tablet by mouth.      ibuprofen (ADVIL,MOTRIN) 800 MG tablet Take 1 tablet by mouth 3 times a day.      loratadine (CLARITIN) 10 MG tablet Take 1 tablet by mouth Daily.       "multivitamin with minerals (MULTIVITAMIN ADULT PO) Take 1 tablet by mouth Daily.      Wheat Dextrin (Benefiber Drink Mix) pack Take  by mouth.         Allergies   Allergen Reactions    Ketoconazole Rash    Nystatin Hives     Patient states that nystatin cream by itself causes hives under her breasts but has been able to use creams that have nystatin in it with other things with no reactions    Penicillins Swelling        Family History   Problem Relation Age of Onset    Cancer Mother         Pre cancer right breast 86    COPD Father     Heart disease Father     Hyperlipidemia Father     Other Maternal Grandfather         ALS    Cancer Maternal Grandmother         Ovarian    Alcohol abuse Brother     Cancer Brother         Colon cancer    Drug abuse Brother         Alcohol    Alcohol abuse Brother     COPD Brother     Drug abuse Brother         Alcohol and drugs    Diabetes Sister     Vision loss Brother         Glaucoma       Social History     Socioeconomic History    Marital status:    Tobacco Use    Smoking status: Former     Current packs/day: 0.00     Average packs/day: 0.5 packs/day for 35.0 years (17.5 ttl pk-yrs)     Types: Cigarettes     Start date: 10/1/1986     Quit date: 10/1/2021     Years since quittin.6    Smokeless tobacco: Never   Vaping Use    Vaping status: Former   Substance and Sexual Activity    Alcohol use: Not Currently     Comment: SOCIAL    Drug use: Never    Sexual activity: Not Currently     Partners: Male     Birth control/protection: Hysterectomy       Review of Systems   Constitutional:  Negative for chills and fever.   Gastrointestinal:  Negative for abdominal distention, abdominal pain, anal bleeding, blood in stool, constipation, diarrhea and rectal pain.        Vital Signs:   /71 (BP Location: Right arm)   Pulse 78   Ht 157.5 cm (62\")   Wt 92.5 kg (204 lb)   BMI 37.31 kg/m²      Physical Exam  Vitals and nursing note reviewed.   Constitutional:       General: " She is not in acute distress.     Appearance: Normal appearance. She is not ill-appearing.   HENT:      Head: Normocephalic and atraumatic.   Cardiovascular:      Rate and Rhythm: Normal rate.   Pulmonary:      Effort: Pulmonary effort is normal.      Breath sounds: No stridor.   Abdominal:      Palpations: Abdomen is soft.      Tenderness: There is no guarding.   Musculoskeletal:         General: No deformity. Normal range of motion.   Skin:     General: Skin is warm and dry.      Coloration: Skin is not jaundiced.   Neurological:      General: No focal deficit present.      Mental Status: She is alert and oriented to person, place, and time.   Psychiatric:         Mood and Affect: Mood normal.         Thought Content: Thought content normal.          Result Review :          []  Laboratory  []  Radiology  []  Pathology  []  Microbiology  []  EKG/Telemetry   []  Cardiology/Vascular   []  Old records       Assessment and Plan    Diagnoses and all orders for this visit:    1. Screening for malignant neoplasm of colon (Primary)  -     Sodium Sulfate-Mag Sulfate-KCl (Sutab) 7880-230-226 MG tablet; Take 12 tablets by mouth Take As Directed. Indications: LOT: [id number] EXP:8/31/25  Dispense: 24 tablet; Refill: 0    2. Family history of colon cancer  -     Sodium Sulfate-Mag Sulfate-KCl (Sutab) [id number] MG tablet; Take 12 tablets by mouth Take As Directed. Indications: LOT: [id number] EXP:8/31/25  Dispense: 24 tablet; Refill: 0        Follow Up   Return for Schedule colonoscopy with Dr. Perez on 6/10/2024 [facility].    Hospital arrival time: 12:00.    Possible risks/complications, benefits, and alternatives to surgical or invasive procedures have been explained to patient and/or legal guardian.    Patient has been evaluated and can tolerate anesthesia and/or sedation. Risks, benefits, and alternatives to anesthesia and sedation have been explained to the patient and/or legal guardian. Patient verbalizes  understanding and is willing to proceed with the above plan.     Patient was given instructions and counseling regarding her condition or for health maintenance advice. Please see specific information pulled into the AVS if appropriate.

## 2024-06-03 NOTE — PRE-PROCEDURE INSTRUCTIONS
Pat call complete. Verified procedure, entrance c, id and insurance cards and arrival time 1200. Npo after 1000. Clear liquid diet educated, pt to follow bowel prep instructions from office.  Must have  over 17y/o. Verbalized understanding of all

## 2024-06-07 ENCOUNTER — ANESTHESIA EVENT (OUTPATIENT)
Dept: GASTROENTEROLOGY | Facility: HOSPITAL | Age: 54
End: 2024-06-07
Payer: MEDICAID

## 2024-06-07 NOTE — ANESTHESIA PREPROCEDURE EVALUATION
Anesthesia Evaluation     Patient summary reviewed and Nursing notes reviewed   no history of anesthetic complications:   NPO Solid Status: > 8 hours  NPO Liquid Status: > 2 hours           Airway   Mallampati: II  TM distance: >3 FB  Neck ROM: full  No difficulty expected  Dental    (+) poor dentition        Pulmonary - normal exam    breath sounds clear to auscultation  (+) ,sleep apnea    ROS comment: Never diagnosed with SHADIA, has history of snoring - worse before gastric sleeve and weight loss  Cardiovascular   Exercise tolerance: good (4-7 METS)    ECG reviewed  Rhythm: regular  Rate: normal        Neuro/Psych  (+) dizziness/light headedness (Vertigo), psychiatric history Depression    ROS Comment: Tinnitus, Vertigo  GI/Hepatic/Renal/Endo    (+) obesity, morbid obesity, GERD well controlled    Musculoskeletal (-) negative ROS    Abdominal    Substance History - negative use     OB/GYN negative ob/gyn ROS         Other - negative ROS       ROS/Med Hx Other:     EKG 8/28/2021:  HR 84 bpm  Sinus rhythm  Abnormal R-wave progression, early transition                      Anesthesia Plan    ASA 3     general   total IV anesthesia  (Total IV Anesthesia    Patient understands anesthesia not responsible for dental damage.  )  intravenous induction     Anesthetic plan, risks, benefits, and alternatives have been provided, discussed and informed consent has been obtained with: patient.  Pre-procedure education provided  Plan discussed with CRNA.        CODE STATUS:

## 2024-06-10 ENCOUNTER — ANESTHESIA (OUTPATIENT)
Dept: GASTROENTEROLOGY | Facility: HOSPITAL | Age: 54
End: 2024-06-10
Payer: MEDICAID

## 2024-06-10 ENCOUNTER — HOSPITAL ENCOUNTER (OUTPATIENT)
Facility: HOSPITAL | Age: 54
Setting detail: HOSPITAL OUTPATIENT SURGERY
Discharge: HOME | End: 2024-06-10
Attending: SURGERY | Admitting: SURGERY
Payer: MEDICAID

## 2024-06-10 VITALS
RESPIRATION RATE: 19 BRPM | BODY MASS INDEX: 36.13 KG/M2 | HEART RATE: 83 BPM | DIASTOLIC BLOOD PRESSURE: 73 MMHG | OXYGEN SATURATION: 100 % | TEMPERATURE: 97.2 F | WEIGHT: 197.53 LBS | SYSTOLIC BLOOD PRESSURE: 119 MMHG

## 2024-06-10 DIAGNOSIS — Z80.0 FAMILY HISTORY OF COLON CANCER: ICD-10-CM

## 2024-06-10 DIAGNOSIS — Z12.11 SCREENING FOR MALIGNANT NEOPLASM OF COLON: ICD-10-CM

## 2024-06-10 PROCEDURE — 25010000002 PROPOFOL 10 MG/ML EMULSION

## 2024-06-10 PROCEDURE — 25810000003 LACTATED RINGERS PER 1000 ML: Performed by: NURSE ANESTHETIST, CERTIFIED REGISTERED

## 2024-06-10 RX ORDER — SODIUM CHLORIDE 0.9 % (FLUSH) 0.9 %
3 SYRINGE (ML) INJECTION EVERY 12 HOURS SCHEDULED
Status: DISCONTINUED | OUTPATIENT
Start: 2024-06-10 | End: 2024-06-10 | Stop reason: HOSPADM

## 2024-06-10 RX ORDER — SODIUM CHLORIDE 9 MG/ML
40 INJECTION, SOLUTION INTRAVENOUS AS NEEDED
Status: DISCONTINUED | OUTPATIENT
Start: 2024-06-10 | End: 2024-06-10 | Stop reason: HOSPADM

## 2024-06-10 RX ORDER — SODIUM CHLORIDE 0.9 % (FLUSH) 0.9 %
10 SYRINGE (ML) INJECTION AS NEEDED
Status: DISCONTINUED | OUTPATIENT
Start: 2024-06-10 | End: 2024-06-10 | Stop reason: HOSPADM

## 2024-06-10 RX ORDER — PROPOFOL 10 MG/ML
VIAL (ML) INTRAVENOUS AS NEEDED
Status: DISCONTINUED | OUTPATIENT
Start: 2024-06-10 | End: 2024-06-10 | Stop reason: SURG

## 2024-06-10 RX ORDER — LIDOCAINE HYDROCHLORIDE 20 MG/ML
INJECTION, SOLUTION EPIDURAL; INFILTRATION; INTRACAUDAL; PERINEURAL AS NEEDED
Status: DISCONTINUED | OUTPATIENT
Start: 2024-06-10 | End: 2024-06-10 | Stop reason: SURG

## 2024-06-10 RX ORDER — SODIUM CHLORIDE, SODIUM LACTATE, POTASSIUM CHLORIDE, CALCIUM CHLORIDE 600; 310; 30; 20 MG/100ML; MG/100ML; MG/100ML; MG/100ML
30 INJECTION, SOLUTION INTRAVENOUS CONTINUOUS
Status: DISCONTINUED | OUTPATIENT
Start: 2024-06-10 | End: 2024-06-10 | Stop reason: HOSPADM

## 2024-06-10 RX ADMIN — PROPOFOL 250 MCG/KG/MIN: 10 INJECTION, EMULSION INTRAVENOUS at 14:32

## 2024-06-10 RX ADMIN — PROPOFOL 50 MG: 10 INJECTION, EMULSION INTRAVENOUS at 14:31

## 2024-06-10 RX ADMIN — LIDOCAINE HYDROCHLORIDE 100 MG: 20 INJECTION, SOLUTION INTRAVENOUS at 14:31

## 2024-06-10 RX ADMIN — SODIUM CHLORIDE, POTASSIUM CHLORIDE, SODIUM LACTATE AND CALCIUM CHLORIDE: 600; 310; 30; 20 INJECTION, SOLUTION INTRAVENOUS at 14:29

## 2024-06-10 NOTE — INTERVAL H&P NOTE
H&P reviewed.  The patient was examined and there are no changes to the H&P risk and benefits discussed with patient and allergies reviewed

## 2024-06-10 NOTE — ANESTHESIA POSTPROCEDURE EVALUATION
Patient: Sonia Lima    Procedure Summary       Date: 06/10/24 Room / Location: [facility] ENDOSCOPY 3 / [facility] ENDOSCOPY    Anesthesia Start: 1429 Anesthesia Stop: 1501    Procedure: COLONOSCOPY Diagnosis:       Screening for malignant neoplasm of colon      Family history of colon cancer      (Screening for malignant neoplasm of colon [Z12.11])      (Family history of colon cancer [Z80.0])    Surgeons: Lety Perez MD Provider: Mariajose Morales CRNA    Anesthesia Type: general ASA Status: 3            Anesthesia Type: general    Vitals  Vitals Value Taken Time   /73 06/10/24 1512   Temp 36.2 °C (97.2 °F) 06/10/24 1511   Pulse 87 06/10/24 1515   Resp 19 06/10/24 1511   SpO2 95 % 06/10/24 1515   Vitals shown include unfiled device data.        Post Anesthesia Care and Evaluation    Post-procedure mental status: acceptable.  Pain management: satisfactory to patient    Airway patency: patent  Anesthetic complications: No anesthetic complications    Cardiovascular status: acceptable  Respiratory status: acceptable  Hydration status: acceptable    Comments: Per chart review

## 2024-07-03 ENCOUNTER — TELEPHONE (OUTPATIENT)
Dept: SURGERY | Facility: CLINIC | Age: 54
End: 2024-07-03
Payer: MEDICAID

## 2024-09-27 ENCOUNTER — OFFICE VISIT (OUTPATIENT)
Dept: FAMILY MEDICINE CLINIC | Facility: CLINIC | Age: 54
End: 2024-09-27
Payer: MEDICAID

## 2024-09-27 VITALS
TEMPERATURE: 98.4 F | SYSTOLIC BLOOD PRESSURE: 120 MMHG | BODY MASS INDEX: 36.49 KG/M2 | DIASTOLIC BLOOD PRESSURE: 72 MMHG | HEART RATE: 83 BPM | WEIGHT: 198.3 LBS | OXYGEN SATURATION: 98 % | HEIGHT: 62 IN

## 2024-09-27 DIAGNOSIS — K59.09 CHRONIC CONSTIPATION: ICD-10-CM

## 2024-09-27 DIAGNOSIS — M79.672 BILATERAL FOOT PAIN: ICD-10-CM

## 2024-09-27 DIAGNOSIS — Z23 NEED FOR INFLUENZA VACCINATION: Primary | ICD-10-CM

## 2024-09-27 DIAGNOSIS — Z12.31 BREAST CANCER SCREENING BY MAMMOGRAM: ICD-10-CM

## 2024-09-27 DIAGNOSIS — M79.671 BILATERAL FOOT PAIN: ICD-10-CM

## 2024-09-27 DIAGNOSIS — M35.3 POLYMYALGIA: ICD-10-CM

## 2024-09-27 PROCEDURE — 90656 IIV3 VACC NO PRSV 0.5 ML IM: CPT | Performed by: FAMILY MEDICINE

## 2024-09-27 PROCEDURE — 99214 OFFICE O/P EST MOD 30 MIN: CPT | Performed by: FAMILY MEDICINE

## 2024-09-27 PROCEDURE — 1126F AMNT PAIN NOTED NONE PRSNT: CPT | Performed by: FAMILY MEDICINE

## 2024-09-27 PROCEDURE — 90471 IMMUNIZATION ADMIN: CPT | Performed by: FAMILY MEDICINE

## 2024-09-27 PROCEDURE — 86038 ANTINUCLEAR ANTIBODIES: CPT | Performed by: FAMILY MEDICINE

## 2024-09-27 NOTE — PROGRESS NOTES
Chief Complaint  Back Pain (Follow up /No concerns/)    Subjective        Sonia Lima presents to [facility]  History of Present Illness  The patient presents for a routine checkup.    She is due for a mammogram, with her last one conducted in 10/2023. She has an upcoming appointment with her oncologist in [place] in the second week of 10/2024, during which a Pap smear and another punch biopsy will be performed to assess her condition.    She experiences occasional flare-ups and is currently using imiquimod cream as a treatment. She has undergone dental work to fix her teeth and is generally feeling well. However, she occasionally experiences days where she feels unmotivated and disinterested in social interaction. On such days, she engages in yard work or other activities to keep herself occupied.    She recently had an epidural injection in her neck for cervical stenosis, which resulted in a severe headache. She was informed that she might be sensitive to steroids. She also suffers from sciatica and issues with her piriformis muscle. Despite these challenges, she plans to return to work.    She has undergone gastric sleeve surgery, which has resulted in a significant reduction in her clothing size, although her weight remains stable at 197 pounds. She has regular bowel movements but occasionally experiences constipation, which she manages with Benefiber.    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 The results showed no significant changes, and a follow-up MRI is planned for next year.    She experiences pain in her heels and ankles, which sometimes makes walking difficult. She has tried various types of footwear without much relief. She was previously diagnosed with plantar fasciitis and was advised to massage her foot with a water bottle. She takes ibuprofen and Flexeril as needed for pain management. She was prescribed  "hydrocodone by her dentist following a dental procedure, but she has not taken any of it.    FAMILY HISTORY  Her brother has Parkinson's.        Past Medical History:   Diagnosis Date    Cataract 2014 2015    Cataract surgery    Detached retina     GERD (gastroesophageal reflux disease)       Social History     Socioeconomic History    Marital status:    Tobacco Use    Smoking status: Former     Current packs/day: 0.00     Average packs/day: 0.5 packs/day for 35.0 years (17.5 ttl pk-yrs)     Types: Cigarettes     Start date: 10/1/1986     Quit date: 10/1/2021     Years since quitting: 3.0     Passive exposure: Past    Smokeless tobacco: Never   Vaping Use    Vaping status: Former   Substance and Sexual Activity    Alcohol use: Not Currently     Comment: SOCIAL    Drug use: Never    Sexual activity: Not Currently     Partners: Male     Birth control/protection: Hysterectomy      Family History   Problem Relation Age of Onset    Cancer Mother         Pre cancer right breast 86    COPD Father     Heart disease Father     Hyperlipidemia Father     Other Maternal Grandfather         ALS    Cancer Maternal Grandmother         Ovarian    Alcohol abuse Brother     Cancer Brother         Colon cancer    Drug abuse Brother         Alcohol    Alcohol abuse Brother     COPD Brother     Drug abuse Brother         Alcohol and drugs    Diabetes Sister     Vision loss Brother         Glaucoma      Objective   Vital Signs:  /72 (BP Location: Left arm, Patient Position: Sitting, Cuff Size: Adult)   Pulse 83   Temp 98.4 °F (36.9 °C) (Oral)   Ht 157.5 cm (62\")   Wt 89.9 kg (198 lb 4.8 oz)   SpO2 98%   BMI 36.27 kg/m²   Estimated body mass index is 36.27 kg/m² as calculated from the following:    Height as of this encounter: 157.5 cm (62\").    Weight as of this encounter: 89.9 kg (198 lb 4.8 oz).     Class 2 Severe Obesity (BMI >=35 and <=39.9). Obesity-related health conditions include the following: hypertension. " Obesity is improving with lifestyle modifications. BMI is is above average; BMI management plan is completed. We discussed low calorie, low carb based diet program, portion control, and increasing exercise.  Review of Systems   Constitutional:  Negative for activity change, chills, fatigue and fever.   HENT:  Negative for congestion, sinus pressure, sinus pain and sore throat.    Eyes:  Negative for discharge and redness.   Respiratory:  Negative for cough and chest tightness.    Cardiovascular:  Negative for chest pain, palpitations and leg swelling.   Gastrointestinal:  Positive for constipation. Negative for abdominal pain and diarrhea.   Endocrine: Negative for cold intolerance and heat intolerance.   Genitourinary:  Negative for difficulty urinating and dysuria.   Musculoskeletal:  Negative for gait problem and neck stiffness.   Skin:  Negative for pallor and rash.   Neurological:  Negative for dizziness and headaches.   Psychiatric/Behavioral:  Negative for agitation, behavioral problems and confusion.         Physical Exam    Physical Exam  Vitals reviewed.   Constitutional:       Appearance: Normal appearance.   HENT:      Right Ear: Tympanic membrane normal.      Left Ear: Tympanic membrane normal.      Nose: Nose normal.   Eyes:      Extraocular Movements: Extraocular movements intact.      Conjunctiva/sclera: Conjunctivae normal.      Pupils: Pupils are equal, round, and reactive to light.   Cardiovascular:      Rate and Rhythm: Normal rate and regular rhythm.   Pulmonary:      Effort: Pulmonary effort is normal.      Breath sounds: Normal breath sounds.   Abdominal:      General: Bowel sounds are normal.   Musculoskeletal:         General: Normal range of motion.      Cervical back: Normal range of motion.   Skin:     General: Skin is warm and dry.   Neurological:      General: No focal deficit present.      Mental Status: She is alert and oriented to person, place, and time.   Psychiatric:         Mood  and Affect: Mood normal.         Behavior: Behavior normal.          Result Review       Results               Assessment and Plan     Diagnoses and all orders for this visit:    1. Need for influenza vaccination (Primary)  -     Fluzone >6mos ([id number])    2. Breast cancer screening by mammogram  -     Mammo Screening Digital Tomosynthesis Bilateral With CAD; Future    3. Chronic constipation    4. Bilateral foot pain    5. Polymyalgia  -     MARIYA With / DsDNA, RNP, Sjogrens A / B, Gonzalez      Assessment & Plan  1. Routine Checkup.  A detailed MRI scan is recommended for her next imaging session to include MRI AC, which looks at the intraocular and inner ear details. Labs will be ordered to assess cholesterol levels and kidney function.     2. Cervical Stenosis.  She experienced a headache following an epidural injection. It was determined that she might be sensitive to the steroids used in the epidural. She is advised to monitor symptoms and report any changes.    3. Sciatica.  She reports excruciating pain in her heels and ankles, which may be related to the sciatic nerve or piriformis muscle. She is advised to continue with nerve ablation and epidural treatments to manage the pain.    4. Constipation.  She experiences chronic constipation and uses Benefiber to help maintain regular bowel movements. She is advised to continue using Benefiber and monitor her bowel movements.    5. Health Maintenance.  She is due for a mammogram, as her last one was in October 2023. She will have a follow-up appointment with her oncologist in October for a Pap smear and further evaluation.         I spent 35 minutes caring for Sonia on this date of service. This time includes time spent by me in the following activities:reviewing tests  Follow Up   Return in about 3 months (around 12/27/2024).  Patient was given instructions and counseling regarding her condition or for health maintenance advice. Please see specific information  pulled into the AVS if appropriate.   Patient or patient representative verbalized consent for the use of Ambient Listening during the visit with  Hamida Urbina MD for chart documentation. 10/16/2024  11:06 EDT    Hamida Urbina MD      Answers submitted by the patient for this visit:  Other (Submitted on 9/24/2024)  Please describe your symptoms.: Follow up and blood work  Have you had these symptoms before?: No  How long have you been having these symptoms?: 1-4 days  Please list any medications you are currently taking for this condition.: Ibuprofen 800mg, Cyclobenzaprine 10mg  Please describe any probable cause for these symptoms. : None  Primary Reason for Visit (Submitted on 9/24/2024)  What is the primary reason for your visit?: Problem Not Listed

## 2024-09-30 LAB — ANA SER QL: NEGATIVE

## 2024-10-23 ENCOUNTER — HOSPITAL ENCOUNTER (OUTPATIENT)
Dept: MAMMOGRAPHY | Facility: HOSPITAL | Age: 54
Discharge: HOME | End: 2024-10-23
Admitting: FAMILY MEDICINE
Payer: MEDICAID

## 2024-10-23 DIAGNOSIS — Z12.31 BREAST CANCER SCREENING BY MAMMOGRAM: ICD-10-CM

## 2024-10-23 PROCEDURE — 77067 SCR MAMMO BI INCL CAD: CPT

## 2024-10-23 PROCEDURE — 77063 BREAST TOMOSYNTHESIS BI: CPT

## 2025-05-01 ENCOUNTER — OFFICE VISIT (OUTPATIENT)
Dept: FAMILY MEDICINE CLINIC | Facility: CLINIC | Age: 55
End: 2025-05-01
Payer: COMMERCIAL

## 2025-05-01 VITALS
BODY MASS INDEX: 36.36 KG/M2 | OXYGEN SATURATION: 97 % | TEMPERATURE: 98.6 F | HEART RATE: 89 BPM | WEIGHT: 197.6 LBS | HEIGHT: 62 IN | DIASTOLIC BLOOD PRESSURE: 80 MMHG | SYSTOLIC BLOOD PRESSURE: 126 MMHG

## 2025-05-01 DIAGNOSIS — R23.3 EASY BRUISING: ICD-10-CM

## 2025-05-01 DIAGNOSIS — L98.9 SKIN LESION OF FACE: ICD-10-CM

## 2025-05-01 DIAGNOSIS — G89.29 CHRONIC LOW BACK PAIN WITH SCIATICA, SCIATICA LATERALITY UNSPECIFIED, UNSPECIFIED BACK PAIN LATERALITY: Primary | ICD-10-CM

## 2025-05-01 DIAGNOSIS — L81.9 CHANGE IN MULTIPLE PIGMENTED SKIN LESIONS: ICD-10-CM

## 2025-05-01 DIAGNOSIS — Z13.220 SCREENING CHOLESTEROL LEVEL: ICD-10-CM

## 2025-05-01 DIAGNOSIS — M54.40 CHRONIC LOW BACK PAIN WITH SCIATICA, SCIATICA LATERALITY UNSPECIFIED, UNSPECIFIED BACK PAIN LATERALITY: Primary | ICD-10-CM

## 2025-05-01 NOTE — PROGRESS NOTES
Chief Complaint  Annual Exam    Subjective        Sonia Lima presents to [facility]  History of Present Illness  The patient presents for evaluation of bruising, depression, and pain.    Pain in the spine is reported, attributed to her work history. Epidural injections in the neck are received every 2 to 3 months, along with SI joint injections. This morning, a piriformis injection was administered due to an inability to stand upright. Another epidural injection is scheduled for 05/15/2025. An ablation procedure was performed on her back on 02/27/2025, providing temporary relief. She mentions turning 55 this year.    Grief following the loss of her , with whom she shared 20 years, has been challenging. A 12-week grief share program was recently completed. Mental health fluctuates, with periods of stability interspersed with episodes of depression. Physical symptoms may be exacerbated by her emotional state. No suicidal ideation is endorsed, but occasional feelings of hopelessness are admitted. She is trying to figure out her purpose and prefers not to talk on the phone, opting instead to curl up and watch her show.    Concern is expressed about a recent lipid panel test conducted at this facility, as the results were difficult to interpret. She recalls being advised against consuming coffee with cream and sugar.    Development of brown spots on the skin over the past few months is noted. Occasional constipation is managed with magnesium citrate. Ibuprofen is taken as needed, and clobetasol ointment is applied when necessary. Imiquimod and fluorouracil are used during severe flare-ups. Triamcinolone cream is used for immediate relief, but if ineffective, clobetasol is used. A significant flare-up a few weeks ago required twice-daily ointment application, resolving after 3 to 4 days. A multivitamin supplement is taken.    Bruising on both arms is observed,  attributed to dogs jumping on her. Even minor contact with her dog can result in skin tears. An IV in her hand this morning resulted in a bruise, a reaction not previously experienced. A recent incident involved a bright purple bruise after bumping herself, which has since faded. No similar bruising is reported on the legs or hips.    Overall well-being is reported, but fatigue is experienced, attributed to an irregular sleep schedule. She typically wakes up between 4:30 and 5:15 AM, regardless of bedtime, and finds it difficult to return to sleep.        Medical History: has a past medical history of Cataract (2014), Cervical stenosis of spine, Detached retina, and GERD (gastroesophageal reflux disease).   Surgical History: has a past surgical history that includes Hysterectomy; Knee surgery; Wrist surgery (Right);  section; Trigger finger release; Eye surgery (? ); Subtotal Hysterectomy (); GASTRIC SLEEVE WITH VENTRAL HERNIA REPAIR; Bariatric Surgery (2023); Hernia repair (2023); and Colonoscopy (N/A, 6/10/2024).   Family History: family history includes Alcohol abuse in her brother and brother; COPD in her brother and father; Cancer in her brother, maternal grandmother, and mother; Diabetes in her sister; Drug abuse in her brother and brother; Heart disease in her father; Hyperlipidemia in her father; Other in her maternal grandfather; Vision loss in her brother.   Social History: reports that she quit smoking about 3 years ago. Her smoking use included cigarettes. She started smoking about 38 years ago. She has a 17.5 pack-year smoking history. She has been exposed to tobacco smoke. She has never used smokeless tobacco. She reports that she does not currently use alcohol. She reports that she does not use drugs.  Immunization History   Administered Date(s) Administered    COVID-19 (PFIZER) Purple Cap Monovalent 2021, 2021    COVID-19 (UNSPECIFIED) 2023     "Fluzone  >6mos 09/27/2024    Fluzone (or Fluarix & Flulaval for VFC) >6mos 10/20/2021    Fluzone Quad >6mos (Multi-dose) 10/12/2020    Influenza, Unspecified 09/21/2023    Tdap 06/26/2018       Objective   Vital Signs:  /80   Pulse 89   Temp 98.6 °F (37 °C) (Oral)   Ht 157.5 cm (62\")   Wt 89.6 kg (197 lb 9.6 oz)   SpO2 97%   BMI 36.14 kg/m²   Estimated body mass index is 36.14 kg/m² as calculated from the following:    Height as of this encounter: 157.5 cm (62\").    Weight as of this encounter: 89.6 kg (197 lb 9.6 oz).             ROS:  Review of Systems   Constitutional:  Negative for chills, diaphoresis, fatigue and fever.   HENT:  Negative for congestion, sore throat and swollen glands.    Respiratory:  Negative for cough.    Cardiovascular:  Negative for chest pain.   Gastrointestinal:  Negative for abdominal pain, nausea and vomiting.   Genitourinary:  Negative for dysuria.   Musculoskeletal:  Positive for myalgias and neck pain.   Skin:  Negative for rash.   Neurological:  Positive for numbness. Negative for weakness.      Physical Exam  Vitals reviewed.   Constitutional:       Appearance: Normal appearance.   HENT:      Right Ear: Tympanic membrane normal.      Left Ear: Tympanic membrane normal.      Nose: Nose normal.   Eyes:      Extraocular Movements: Extraocular movements intact.      Conjunctiva/sclera: Conjunctivae normal.      Pupils: Pupils are equal, round, and reactive to light.   Cardiovascular:      Rate and Rhythm: Normal rate and regular rhythm.   Pulmonary:      Effort: Pulmonary effort is normal.      Breath sounds: Normal breath sounds.   Abdominal:      General: Bowel sounds are normal.   Musculoskeletal:         General: Normal range of motion.      Cervical back: Normal range of motion.   Skin:     General: Skin is warm and dry.   Neurological:      General: No focal deficit present.      Mental Status: She is alert and oriented to person, place, and time.   Psychiatric:       "   Mood and Affect: Mood normal.         Behavior: Behavior normal.       Physical Exam  Respiratory: Clear to auscultation, no wheezing, rales or rhonchi      Result Review     The following data was reviewed by: Hamida Urbina MD on 05/01/2025:    Results  Labs   - MARIYA test: Negative             Assessment and Plan    Diagnoses and all orders for this visit:    1. Chronic low back pain with sciatica, sciatica laterality unspecified, unspecified back pain laterality (Primary)  -     Comprehensive Metabolic Panel    2. Screening cholesterol level  -     Lipid Panel    3. Easy bruising  -     CBC Auto Differential  -     Iron Profile  -     Vitamin B12 & Folate  -     Vitamin C  -     Vitamin K1    4. Skin lesion of face  -     Ambulatory Referral to Dermatology    5. Change in multiple pigmented skin lesions  -     Ambulatory Referral to Dermatology      Assessment & Plan  1. Pain management.  - She received a piriformis injection today for her back pain. An epidural injection for her neck is scheduled for 06/15/2025.  - The ablation performed on 02/27/2025 for her back pain has been effective but wears off after approximately six months.  - She continues to receive SI joint injections every two to three months.  - Reports that her back pain is exacerbated by physical activity, such as yard work.    2. Depression.  - Reports fluctuating depressive symptoms, particularly after the loss of her .  - Not experiencing suicidal ideation but has had thoughts of purposelessness.  - Encouraged to find new purposes and activities to help manage her symptoms.  - Discussed the importance of not isolating herself and maintaining a routine.    3. Lipid panel.  - A lipid panel will be ordered to assess her cholesterol levels.  - Blood draw to be done at the hospital due to the light-sensitive nature of some tests.  - Previous lipid panel results were not found in the chart; the last office visit was in 09/2024.    4.  Bruising.  - Reports unexplained bruising on her arms, which may be related to low platelet count or anemia.  - Blood tests will be ordered to check her iron levels, blood count, and platelets.  - Vitamin K and vitamin C levels will also be assessed to rule out deficiencies.  - Noted that bruising occurs even with minor trauma, such as her dog jumping on her.    5. Fatigue.  - Reports feeling tired, which may be related to her sleep schedule or potential anemia.  - Blood tests will be ordered to check her iron levels and blood count.  - Discussed her irregular sleep patterns and early morning awakenings.    6. Health Maintenance.  - Last colonoscopy was performed on 07/03/2024.  - A mammogram will be scheduled for the end of the year, as it is not due until 10/2025.  - Encouraged to continue regular health screenings and follow-up appointments.    Follow-up  - Follow up in 1 month to review lab results.       I spent 35 minutes caring for Sonia on this date of service. This time includes time spent by me in the following activities:reviewing tests  Follow Up  Return in about 3 months (around 8/1/2025).  Patient was given instructions and counseling regarding her condition or for health maintenance advice. Please see specific information pulled into the AVS if appropriate.   Patient or patient representative verbalized consent for the use of Ambient Listening during the visit with  Hamida Urbina MD for chart documentation. 5/2/2025  16:06 EDT    Hamida Urbina MD      Answers submitted by the patient for this visit:  Problem not listed (Submitted on 4/29/2025)  Chief Complaint: Other medical problem  Reason for appointment: Follow up  anorexia: No  joint pain: No  change in stool: No  headaches: No  joint swelling: No  vertigo: No  visual change: No  Onset: in the past 7 days  Chronicity: recurrent  Frequency: daily  Medications tried: Ibuprofen rx cyclobenzoprine rx epidural, injections

## 2025-05-03 ENCOUNTER — LAB (OUTPATIENT)
Facility: HOSPITAL | Age: 55
End: 2025-05-03
Payer: COMMERCIAL

## 2025-05-03 LAB
ALBUMIN SERPL-MCNC: 4.2 G/DL (ref 3.5–5.2)
ALBUMIN/GLOB SERPL: 1.4 G/DL
ALP SERPL-CCNC: 108 U/L (ref 39–117)
ALT SERPL W P-5'-P-CCNC: 7 U/L (ref 1–33)
ANION GAP SERPL CALCULATED.3IONS-SCNC: 8.7 MMOL/L (ref 5–15)
AST SERPL-CCNC: 15 U/L (ref 1–32)
BASOPHILS # BLD AUTO: 0.04 10*3/MM3 (ref 0–0.2)
BASOPHILS NFR BLD AUTO: 0.5 % (ref 0–1.5)
BILIRUB SERPL-MCNC: 0.4 MG/DL (ref 0–1.2)
BUN SERPL-MCNC: 14 MG/DL (ref 6–20)
BUN/CREAT SERPL: 20.3 (ref 7–25)
CALCIUM SPEC-SCNC: 9.7 MG/DL (ref 8.6–10.5)
CHLORIDE SERPL-SCNC: 105 MMOL/L (ref 98–107)
CHOLEST SERPL-MCNC: 149 MG/DL (ref 0–200)
CO2 SERPL-SCNC: 22.3 MMOL/L (ref 22–29)
CREAT SERPL-MCNC: 0.69 MG/DL (ref 0.57–1)
DEPRECATED RDW RBC AUTO: 38.8 FL (ref 37–54)
EGFRCR SERPLBLD CKD-EPI 2021: 102.6 ML/MIN/1.73
EOSINOPHIL # BLD AUTO: 0 10*3/MM3 (ref 0–0.4)
EOSINOPHIL NFR BLD AUTO: 0 % (ref 0.3–6.2)
ERYTHROCYTE [DISTWIDTH] IN BLOOD BY AUTOMATED COUNT: 13.2 % (ref 12.3–15.4)
FOLATE SERPL-MCNC: 5.11 NG/ML (ref 4.78–24.2)
GLOBULIN UR ELPH-MCNC: 3.1 GM/DL
GLUCOSE SERPL-MCNC: 94 MG/DL (ref 65–99)
HCT VFR BLD AUTO: 39.3 % (ref 34–46.6)
HDLC SERPL-MCNC: 69 MG/DL (ref 40–60)
HGB BLD-MCNC: 13.1 G/DL (ref 12–15.9)
IMM GRANULOCYTES # BLD AUTO: 0.03 10*3/MM3 (ref 0–0.05)
IMM GRANULOCYTES NFR BLD AUTO: 0.4 % (ref 0–0.5)
IRON 24H UR-MRATE: 54 MCG/DL (ref 37–145)
IRON SATN MFR SERPL: 13 % (ref 20–50)
LDLC SERPL CALC-MCNC: 64 MG/DL (ref 0–100)
LDLC/HDLC SERPL: 0.91 {RATIO}
LYMPHOCYTES # BLD AUTO: 1.25 10*3/MM3 (ref 0.7–3.1)
LYMPHOCYTES NFR BLD AUTO: 16.2 % (ref 19.6–45.3)
MCH RBC QN AUTO: 27.1 PG (ref 26.6–33)
MCHC RBC AUTO-ENTMCNC: 33.3 G/DL (ref 31.5–35.7)
MCV RBC AUTO: 81.2 FL (ref 79–97)
MONOCYTES # BLD AUTO: 0.51 10*3/MM3 (ref 0.1–0.9)
MONOCYTES NFR BLD AUTO: 6.6 % (ref 5–12)
NEUTROPHILS NFR BLD AUTO: 5.9 10*3/MM3 (ref 1.7–7)
NEUTROPHILS NFR BLD AUTO: 76.3 % (ref 42.7–76)
NRBC BLD AUTO-RTO: 0 /100 WBC (ref 0–0.2)
PLATELET # BLD AUTO: 285 10*3/MM3 (ref 140–450)
PMV BLD AUTO: 11.6 FL (ref 6–12)
POTASSIUM SERPL-SCNC: 4 MMOL/L (ref 3.5–5.2)
PROT SERPL-MCNC: 7.3 G/DL (ref 6–8.5)
RBC # BLD AUTO: 4.84 10*6/MM3 (ref 3.77–5.28)
SODIUM SERPL-SCNC: 136 MMOL/L (ref 136–145)
TIBC SERPL-MCNC: 402 MCG/DL (ref 298–536)
TRANSFERRIN SERPL-MCNC: 270 MG/DL (ref 200–360)
TRIGL SERPL-MCNC: 85 MG/DL (ref 0–150)
VIT B12 BLD-MCNC: 636 PG/ML (ref 211–946)
VLDLC SERPL-MCNC: 16 MG/DL (ref 5–40)
WBC NRBC COR # BLD AUTO: 7.73 10*3/MM3 (ref 3.4–10.8)

## 2025-05-03 PROCEDURE — 84597 ASSAY OF VITAMIN K: CPT | Performed by: FAMILY MEDICINE

## 2025-05-03 PROCEDURE — 82180 ASSAY OF ASCORBIC ACID: CPT | Performed by: FAMILY MEDICINE

## 2025-05-03 PROCEDURE — 80061 LIPID PANEL: CPT | Performed by: FAMILY MEDICINE

## 2025-05-03 PROCEDURE — 83540 ASSAY OF IRON: CPT | Performed by: FAMILY MEDICINE

## 2025-05-03 PROCEDURE — 82746 ASSAY OF FOLIC ACID SERUM: CPT | Performed by: FAMILY MEDICINE

## 2025-05-03 PROCEDURE — 84466 ASSAY OF TRANSFERRIN: CPT | Performed by: FAMILY MEDICINE

## 2025-05-03 PROCEDURE — 80053 COMPREHEN METABOLIC PANEL: CPT | Performed by: FAMILY MEDICINE

## 2025-05-03 PROCEDURE — 82607 VITAMIN B-12: CPT | Performed by: FAMILY MEDICINE

## 2025-05-03 PROCEDURE — 85025 COMPLETE CBC W/AUTO DIFF WBC: CPT | Performed by: FAMILY MEDICINE

## 2025-05-08 LAB — VIT C SERPL-MCNC: 0.6 MG/DL (ref 0.4–2)

## 2025-05-20 LAB — PHYTONADIONE SERPL-MCNC: 0.12 NG/ML (ref 0.1–2.2)

## 2025-05-23 ENCOUNTER — OFFICE VISIT (OUTPATIENT)
Dept: FAMILY MEDICINE CLINIC | Facility: CLINIC | Age: 55
End: 2025-05-23
Payer: COMMERCIAL

## 2025-05-23 VITALS
HEIGHT: 62 IN | WEIGHT: 198.3 LBS | SYSTOLIC BLOOD PRESSURE: 128 MMHG | DIASTOLIC BLOOD PRESSURE: 76 MMHG | OXYGEN SATURATION: 99 % | BODY MASS INDEX: 36.49 KG/M2 | HEART RATE: 83 BPM | TEMPERATURE: 98.1 F

## 2025-05-23 DIAGNOSIS — R06.09 DYSPNEA ON EXERTION: ICD-10-CM

## 2025-05-23 DIAGNOSIS — R53.82 CHRONIC FATIGUE: Primary | ICD-10-CM

## 2025-05-23 LAB
T-UPTAKE NFR SERPL: 1.16 TBI (ref 0.8–1.3)
T4 SERPL-MCNC: 8.64 MCG/DL (ref 4.5–11.7)
TSH SERPL DL<=0.05 MIU/L-ACNC: 1.07 UIU/ML (ref 0.27–4.2)

## 2025-05-23 PROCEDURE — 1159F MED LIST DOCD IN RCRD: CPT | Performed by: FAMILY MEDICINE

## 2025-05-23 PROCEDURE — 1160F RVW MEDS BY RX/DR IN RCRD: CPT | Performed by: FAMILY MEDICINE

## 2025-05-23 PROCEDURE — 84479 ASSAY OF THYROID (T3 OR T4): CPT | Performed by: FAMILY MEDICINE

## 2025-05-23 PROCEDURE — 84443 ASSAY THYROID STIM HORMONE: CPT | Performed by: FAMILY MEDICINE

## 2025-05-23 PROCEDURE — 84436 ASSAY OF TOTAL THYROXINE: CPT | Performed by: FAMILY MEDICINE

## 2025-05-23 PROCEDURE — 1126F AMNT PAIN NOTED NONE PRSNT: CPT | Performed by: FAMILY MEDICINE

## 2025-05-23 PROCEDURE — 99214 OFFICE O/P EST MOD 30 MIN: CPT | Performed by: FAMILY MEDICINE

## 2025-05-23 NOTE — LETTER
Ese 10, 2025    Sonia Lima  [address] [place] [id number]      Dear Ms. Donell Lima      As your healthcare provider, we are committed to ensuring that you receive timely checkups and tests to keep you in good health.    Our records indicate that you did not have the following ordered tests or procedures completed: Lab Work     Please call the office at your earliest convenience to discuss next steps.  If you did have the ordered testing performed at a location other than [facility],   please call and have that facility send us the results.     Thank you for allowing us to take part in your healthcare. If you have any questions, please feel free to call us.      Sincerely,    [facility]  Hamida Urbina MD

## 2025-05-23 NOTE — PROGRESS NOTES
Chief Complaint  Follow-up    Subjective        Sonia Lima presents to [facility]  History of Present Illness  The patient presents for evaluation of fatigue.    She has been experiencing persistent fatigue, which she attributes to low iron saturation and vitamin K levels, as indicated by her recent blood work. She suspects that her fatigue may be due to malabsorption of vitamins following her gastric sleeve surgery. Despite consuming a diet rich in spinach and other leafy greens, she continues to experience these symptoms. She also reports occasional shortness of breath, occurring once or twice a week, but does not experience any associated chest pain. She does not have any difficulty lying flat at night and reports no leg swelling. She has been supplementing her diet with chewable children's vitamins in an attempt to improve her vitamin absorption. Her diet includes rice flour, coconut flour, seafood, fish, chicken wings, beans, and bean salad. She avoids beef and pork, opting for fish and chicken as her primary sources of protein. She also consumes Premier protein shakes daily. She underwent weight loss surgery due to a history of central obesity, which she was aware could increase her risk of heart disease.    She reports bruising easily, which she attributes to low vitamin K levels. She has been trying to incorporate more vegetables into her diet, including broccoli greens, which she recently discovered can be cooked similarly to cristiana greens.    She has back issues and takes ibuprofen daily. She does not take muscle relaxers every day. She has had epidural and piriformis injections.    PAST SURGICAL HISTORY:  Gastric sleeve surgery    FAMILY HISTORY  Her mother had a pacemaker due to mitral valve issues. Her brother has a pacemaker. Her father had heart problems.        Medical History: has a past medical history of Cataract (2014 2015), Cervical stenosis of  "spine, Detached retina, and GERD (gastroesophageal reflux disease).   Surgical History: has a past surgical history that includes Hysterectomy; Knee surgery; Wrist surgery (Right);  section; Trigger finger release; Eye surgery (? ); Subtotal Hysterectomy (2015); GASTRIC SLEEVE WITH VENTRAL HERNIA REPAIR; Bariatric Surgery (2023); Hernia repair (2023); and Colonoscopy (N/A, 6/10/2024).   Family History: family history includes Alcohol abuse in her brother and brother; COPD in her brother and father; Cancer in her brother, maternal grandmother, and mother; Diabetes in her sister; Drug abuse in her brother and brother; Heart disease in her father; Hyperlipidemia in her father; Other in her maternal grandfather; Vision loss in her brother.   Social History: reports that she quit smoking about 3 years ago. Her smoking use included cigarettes. She started smoking about 38 years ago. She has a 17.5 pack-year smoking history. She has been exposed to tobacco smoke. She has never used smokeless tobacco. She reports that she does not currently use alcohol. She reports that she does not use drugs.  Immunization History   Administered Date(s) Administered    COVID-19 (PFIZER) Purple Cap Monovalent 2021, 2021    COVID-19 (UNSPECIFIED) 2023    Fluzone  >6mos 2024    Fluzone (or Fluarix & Flulaval for VFC) >6mos 10/20/2021    Fluzone Quad >6mos (Multi-dose) 10/12/2020    Influenza, Unspecified 2023    Tdap 2018       Objective   Vital Signs:  /76   Pulse 83   Temp 98.1 °F (36.7 °C) (Oral)   Ht 157.5 cm (62\")   Wt 89.9 kg (198 lb 4.8 oz)   SpO2 99%   BMI 36.27 kg/m²   Estimated body mass index is 36.27 kg/m² as calculated from the following:    Height as of this encounter: 157.5 cm (62\").    Weight as of this encounter: 89.9 kg (198 lb 4.8 oz).             ROS:  Review of Systems   Constitutional:  Positive for fatigue. Negative for chills, diaphoresis and " fever.   HENT:  Negative for congestion, sore throat and swollen glands.    Respiratory:  Negative for cough.    Cardiovascular:  Negative for chest pain.   Gastrointestinal:  Negative for abdominal pain, nausea and vomiting.   Genitourinary:  Negative for dysuria.   Musculoskeletal:  Negative for myalgias and neck pain.   Skin:  Negative for rash.   Neurological:  Negative for weakness and numbness.      Physical Exam  Vitals reviewed.   Constitutional:       Appearance: Normal appearance.   HENT:      Right Ear: Tympanic membrane normal.      Left Ear: Tympanic membrane normal.      Nose: Nose normal.   Eyes:      Extraocular Movements: Extraocular movements intact.      Conjunctiva/sclera: Conjunctivae normal.      Pupils: Pupils are equal, round, and reactive to light.   Cardiovascular:      Rate and Rhythm: Normal rate and regular rhythm.   Pulmonary:      Effort: Pulmonary effort is normal.      Breath sounds: Normal breath sounds.   Abdominal:      General: Bowel sounds are normal.   Musculoskeletal:         General: Normal range of motion.      Cervical back: Normal range of motion.   Skin:     General: Skin is warm and dry.   Neurological:      General: No focal deficit present.      Mental Status: She is alert and oriented to person, place, and time.   Psychiatric:         Mood and Affect: Mood normal.         Behavior: Behavior normal.       Physical Exam  Respiratory: Clear to auscultation, no wheezing, rales or rhonchi  Cardiovascular: Regular rate and rhythm, no murmurs, rubs, or gallops  Extremities: No edema, no cyanosis      Result Review     The following data was reviewed by: Hamida Urbina MD on 05/23/2025:  Common labs          5/3/2025    07:56   Common Labs   Glucose 94    BUN 14    Creatinine 0.69    Sodium 136    Potassium 4.0    Chloride 105    Calcium 9.7    Albumin 4.2    Total Bilirubin 0.4    Alkaline Phosphatase 108    AST (SGOT) 15    ALT (SGPT) 7    WBC 7.73    Hemoglobin 13.1     Hematocrit 39.3    Platelets 285    Total Cholesterol 149    Triglycerides 85    HDL Cholesterol 69    LDL Cholesterol  64      Results  Labs   - Iron saturation: Low   - Vitamin K: Low   - Iron level: Normal   - Platelets: Normal             Assessment and Plan   Diagnoses and all orders for this visit:    1. Chronic fatigue (Primary)  -     Thyroid Antibodies  -     Thyroid Panel With TSH    2. Dyspnea on exertion  -     Adult Transthoracic Echo Complete W/ Cont if Necessary Per Protocol; Future      Assessment & Plan  1. Fatigue.  - Laboratory results do not indicate any significant abnormalities.  - Last thyroid function test yielded normal results.  - Reports feeling refreshed upon waking but experiences rapid fatigue throughout the day, raising concerns about potential cardiac issues.  - An echocardiogram will be ordered to evaluate cardiac function. Additionally, a comprehensive thyroid panel, including antibody testing, will be conducted to rule out Hashimoto's disease.       I spent 35 minutes caring for Sonia on this date of service. This time includes time spent by me in the following activities:reviewing tests  Follow Up   Return in about 4 weeks (around 6/20/2025).  Patient was given instructions and counseling regarding her condition or for health maintenance advice. Please see specific information pulled into the AVS if appropriate.   Patient or patient representative verbalized consent for the use of Ambient Listening during the visit with  Hamida Urbina MD for chart documentation. 6/9/2025  15:30 EDT    Hamida Urbina MD      Answers submitted by the patient for this visit:  Problem not listed (Submitted on 5/16/2025)  Chief Complaint: Other medical problem  anorexia: No  joint pain: No  change in stool: No  headaches: No  joint swelling: No  vertigo: No  visual change: No  Other symptom: Easily bruise  Onset: 1 to 6 months  Chronicity: new  Frequency: daily  Medications tried: Started  chewabke vitamins and soft chew iron with vitamin c

## 2025-06-04 ENCOUNTER — HOSPITAL ENCOUNTER (OUTPATIENT)
Facility: HOSPITAL | Age: 55
Discharge: HOME | End: 2025-06-04
Admitting: FAMILY MEDICINE
Payer: COMMERCIAL

## 2025-06-04 DIAGNOSIS — R06.09 DYSPNEA ON EXERTION: ICD-10-CM

## 2025-06-04 PROCEDURE — 93306 TTE W/DOPPLER COMPLETE: CPT

## 2025-06-05 LAB
AORTIC DIMENSIONLESS INDEX: 0.94 (DI)
ASCENDING AORTA: 3.1 CM
AV MEAN PRESS GRAD SYS DOP V1V2: 3 MMHG
AV VMAX SYS DOP: 119 CM/SEC
BH CV ECHO MEAS - AO MAX PG: 5.7 MMHG
BH CV ECHO MEAS - AO ROOT DIAM: 2.4 CM
BH CV ECHO MEAS - AO V2 VTI: 21.7 CM
BH CV ECHO MEAS - AVA(I,D): 3 CM2
BH CV ECHO MEAS - EDV(MOD-SP2): 77.2 ML
BH CV ECHO MEAS - EDV(MOD-SP4): 82.4 ML
BH CV ECHO MEAS - EF(MOD-SP2): 59.7 %
BH CV ECHO MEAS - EF(MOD-SP4): 58.4 %
BH CV ECHO MEAS - ESV(MOD-SP2): 31.1 ML
BH CV ECHO MEAS - ESV(MOD-SP4): 34.3 ML
BH CV ECHO MEAS - IVS/LVPW: 0.92 CM
BH CV ECHO MEAS - IVSD: 1.2 CM
BH CV ECHO MEAS - LA DIMENSION: 3 CM
BH CV ECHO MEAS - LAT PEAK E' VEL: 11.7 CM/SEC
BH CV ECHO MEAS - LV DIASTOLIC VOL/BSA (35-75): 43.4 CM2
BH CV ECHO MEAS - LV MAX PG: 4.8 MMHG
BH CV ECHO MEAS - LV MEAN PG: 2.6 MMHG
BH CV ECHO MEAS - LV SYSTOLIC VOL/BSA (12-30): 18.1 CM2
BH CV ECHO MEAS - LV V1 MAX: 110 CM/SEC
BH CV ECHO MEAS - LV V1 VTI: 20.4 CM
BH CV ECHO MEAS - LVIDD: 3.5 CM
BH CV ECHO MEAS - LVIDS: 2.4 CM
BH CV ECHO MEAS - LVOT AREA: 3.1 CM2
BH CV ECHO MEAS - LVOT DIAM: 2 CM
BH CV ECHO MEAS - LVPWD: 1.3 CM
BH CV ECHO MEAS - MED PEAK E' VEL: 8.6 CM/SEC
BH CV ECHO MEAS - MR MAX PG: 20.2 MMHG
BH CV ECHO MEAS - MR MAX VEL: 223.6 CM/SEC
BH CV ECHO MEAS - MV A MAX VEL: 90 CM/SEC
BH CV ECHO MEAS - MV DEC SLOPE: 274 CM/SEC2
BH CV ECHO MEAS - MV E MAX VEL: 46.6 CM/SEC
BH CV ECHO MEAS - MV E/A: 0.52
BH CV ECHO MEAS - MV MAX PG: 2.8 MMHG
BH CV ECHO MEAS - MV MEAN PG: 1 MMHG
BH CV ECHO MEAS - MV P1/2T: 62 MSEC
BH CV ECHO MEAS - MV V2 VTI: 17.9 CM
BH CV ECHO MEAS - MVA(P1/2T): 3.6 CM2
BH CV ECHO MEAS - MVA(VTI): 3.6 CM2
BH CV ECHO MEAS - RAP SYSTOLE: 3 MMHG
BH CV ECHO MEAS - RVDD: 2.7 CM
BH CV ECHO MEAS - RVSP: 19 MMHG
BH CV ECHO MEAS - SV(LVOT): 64.2 ML
BH CV ECHO MEAS - SV(MOD-SP2): 46.1 ML
BH CV ECHO MEAS - SV(MOD-SP4): 48.1 ML
BH CV ECHO MEAS - SVI(LVOT): 33.8 ML/M2
BH CV ECHO MEAS - SVI(MOD-SP2): 24.3 ML/M2
BH CV ECHO MEAS - SVI(MOD-SP4): 25.3 ML/M2
BH CV ECHO MEAS - TR MAX PG: 16 MMHG
BH CV ECHO MEAS - TR MAX VEL: 199.9 CM/SEC
BH CV ECHO MEASUREMENTS AVERAGE E/E' RATIO: 4.59
BH CV XLRA - TDI S': 13.1 CM/SEC
LEFT ATRIUM VOLUME INDEX: 21.2 ML/M2
LV EF BIPLANE MOD: 58.5 %

## 2025-07-08 ENCOUNTER — OFFICE VISIT (OUTPATIENT)
Dept: FAMILY MEDICINE CLINIC | Facility: CLINIC | Age: 55
End: 2025-07-08
Payer: COMMERCIAL

## 2025-07-08 VITALS
BODY MASS INDEX: 35.68 KG/M2 | WEIGHT: 193.9 LBS | DIASTOLIC BLOOD PRESSURE: 72 MMHG | HEIGHT: 62 IN | OXYGEN SATURATION: 97 % | HEART RATE: 83 BPM | SYSTOLIC BLOOD PRESSURE: 114 MMHG | TEMPERATURE: 99.1 F

## 2025-07-08 DIAGNOSIS — G89.29 CHRONIC LOW BACK PAIN WITH SCIATICA, SCIATICA LATERALITY UNSPECIFIED, UNSPECIFIED BACK PAIN LATERALITY: Primary | ICD-10-CM

## 2025-07-08 DIAGNOSIS — M54.40 CHRONIC LOW BACK PAIN WITH SCIATICA, SCIATICA LATERALITY UNSPECIFIED, UNSPECIFIED BACK PAIN LATERALITY: Primary | ICD-10-CM

## 2025-07-08 DIAGNOSIS — R51.9 NONINTRACTABLE HEADACHE, UNSPECIFIED CHRONICITY PATTERN, UNSPECIFIED HEADACHE TYPE: ICD-10-CM

## 2025-07-08 DIAGNOSIS — R23.3 EASY BRUISING: ICD-10-CM

## 2025-07-08 PROCEDURE — 1160F RVW MEDS BY RX/DR IN RCRD: CPT | Performed by: FAMILY MEDICINE

## 2025-07-08 PROCEDURE — 1126F AMNT PAIN NOTED NONE PRSNT: CPT | Performed by: FAMILY MEDICINE

## 2025-07-08 PROCEDURE — 99214 OFFICE O/P EST MOD 30 MIN: CPT | Performed by: FAMILY MEDICINE

## 2025-07-08 PROCEDURE — 1159F MED LIST DOCD IN RCRD: CPT | Performed by: FAMILY MEDICINE

## 2025-07-08 RX ORDER — DICLOFENAC SODIUM 75 MG/1
75 TABLET, DELAYED RELEASE ORAL 2 TIMES DAILY PRN
Qty: 60 TABLET | Refills: 2 | Status: SHIPPED | OUTPATIENT
Start: 2025-07-08 | End: 2025-08-07

## 2025-07-08 NOTE — PROGRESS NOTES
Chief Complaint  Bleeding/Bruising and Follow-up    Subjective        Sonia Lima presents to [facility]  History of Present Illness  The patient presents for evaluation of bruising, back pain, and headache.    She reports persistent bruising on her leg that has been present for 2 weeks and is now fading. Additionally, she mentions a small scratch on her arm that resulted in significant bleeding, as well as bruising on her stomach, which she attributes to leaning against objects. Minor actions, such as scratching her leg or bumping into something, also cause bruising. She has not taken ibuprofen for the past 5 or 6 days. She has a standing prescription for ibuprofen 800 mg, which she takes twice a week, but occasionally goes a week or more without it. She has tried baby aspirin 81 mg in the past but discontinued it due to excessive bruising.    She has been diagnosed with cervical stenosis and a bulging disc, which are being managed with epidural injections. She is trying to avoid surgery. She has been advised by Dr. Mas to maintain mobility and avoid heavy lifting. She uses a grabber to avoid bending over. She has received injections for her lower back pain, which have been beneficial. She occasionally takes cyclobenzaprine when necessary.    She experienced sharp stomach pains after drinking water from her [place] bottle, which she initially dismissed as needing to use the bathroom. However, the pain recurred after another drink, and she later developed severe cramps and felt unwell. She suspects she may have overheated, as she did not sweat excessively and does not consume ice water. She felt unwell on Friday and most of Saturday, but her condition improved late Saturday.    She had an elevated temperature today, initially recorded at 99.3 and later at 99.1. She also reports a headache, which she attributes to being outside and engaging in activities. Despite  "drinking water, the headache persists.    She reports no significant shortness of breath, which she believes may be due to allergies. She has not had any abnormal heart findings or blood work results.        Medical History: has a past medical history of Cataract (2014), Cervical stenosis of spine, Detached retina, and GERD (gastroesophageal reflux disease).   Surgical History: has a past surgical history that includes Hysterectomy; Knee surgery; Wrist surgery (Right);  section; Trigger finger release; Eye surgery (? ); Subtotal Hysterectomy (); GASTRIC SLEEVE WITH VENTRAL HERNIA REPAIR; Bariatric Surgery (2023); Hernia repair (2023); and Colonoscopy (N/A, 6/10/2024).   Family History: family history includes Alcohol abuse in her brother and brother; COPD in her brother and father; Cancer in her brother, maternal grandmother, and mother; Diabetes in her sister; Drug abuse in her brother and brother; Heart disease in her father; Hyperlipidemia in her father; Other in her maternal grandfather; Vision loss in her brother.   Social History: reports that she quit smoking about 3 years ago. Her smoking use included cigarettes. She started smoking about 38 years ago. She has a 17.5 pack-year smoking history. She has been exposed to tobacco smoke. She has never used smokeless tobacco. She reports that she does not currently use alcohol. She reports that she does not use drugs.  Immunization History   Administered Date(s) Administered    COVID-19 (PFIZER) Purple Cap Monovalent 2021, 2021    COVID-19 (UNSPECIFIED) 2023    Fluzone  >6mos 2024    Fluzone (or Fluarix & Flulaval for VFC) >6mos 10/20/2021    Fluzone Quad >6mos (Multi-dose) 10/12/2020    Influenza, Unspecified 2023    Tdap 2018       Objective   Vital Signs:  /72   Pulse 83   Temp 99.1 °F (37.3 °C)   Ht 157.5 cm (62\")   Wt 88 kg (193 lb 14.4 oz)   SpO2 97%   BMI 35.46 kg/m² " "  Estimated body mass index is 35.46 kg/m² as calculated from the following:    Height as of this encounter: 157.5 cm (62\").    Weight as of this encounter: 88 kg (193 lb 14.4 oz).             ROS:  Review of Systems   Constitutional:  Negative for fatigue and fever.   HENT:  Negative for congestion, ear pain and sinus pressure.    Respiratory:  Negative for cough, chest tightness and shortness of breath.    Cardiovascular:  Negative for chest pain, palpitations and leg swelling.   Gastrointestinal:  Negative for abdominal pain and diarrhea.   Genitourinary:  Negative for dysuria and frequency.   Neurological:  Negative for speech difficulty, headache and confusion.   Psychiatric/Behavioral:  Negative for agitation and behavioral problems.       Physical Exam  Vitals reviewed.   Constitutional:       Appearance: Normal appearance.   HENT:      Right Ear: Tympanic membrane normal.      Left Ear: Tympanic membrane normal.      Nose: Nose normal.   Eyes:      Extraocular Movements: Extraocular movements intact.      Conjunctiva/sclera: Conjunctivae normal.      Pupils: Pupils are equal, round, and reactive to light.   Cardiovascular:      Rate and Rhythm: Normal rate and regular rhythm.   Pulmonary:      Effort: Pulmonary effort is normal.      Breath sounds: Normal breath sounds.   Abdominal:      General: Bowel sounds are normal.   Musculoskeletal:         General: Normal range of motion.      Cervical back: Normal range of motion.   Skin:     General: Skin is warm and dry.   Neurological:      General: No focal deficit present.      Mental Status: She is alert and oriented to person, place, and time.   Psychiatric:         Mood and Affect: Mood normal.         Behavior: Behavior normal.       Physical Exam  Respiratory: Slight wheeze noted on one side, otherwise clear to auscultation.      Result Review     The following data was reviewed by: Hamida Urbina MD on 07/08/2025:  Common labs          5/3/2025    " 07:56   Common Labs   Glucose 94    BUN 14    Creatinine 0.69    Sodium 136    Potassium 4.0    Chloride 105    Calcium 9.7    Albumin 4.2    Total Bilirubin 0.4    Alkaline Phosphatase 108    AST (SGOT) 15    ALT (SGPT) 7    WBC 7.73    Hemoglobin 13.1    Hematocrit 39.3    Platelets 285    Total Cholesterol 149    Triglycerides 85    HDL Cholesterol 69    LDL Cholesterol  64      Results               Assessment and Plan     Diagnoses and all orders for this visit:    1. Chronic low back pain with sciatica, sciatica laterality unspecified, unspecified back pain laterality (Primary)  -     diclofenac (VOLTAREN) 75 MG EC tablet; Take 1 tablet by mouth 2 (Two) Times a Day As Needed (low back pain) for up to 30 days.  Dispense: 60 tablet; Refill: 2    2. Nonintractable headache, unspecified chronicity pattern, unspecified headache type    3. Easy bruising      Assessment & Plan  1. Bruising.  - The bruising could be a result of ibuprofen intake, which has blood-thinning properties.  - Recent labs reviewed; no concerning findings noted.  - Discussed the potential for ibuprofen to cause bruising and the possibility of switching to diclofenac.  - A prescription for diclofenac will be provided as an alternative to ibuprofen. Advised to take this medication with food.    2. Back pain.  - Reports lower back pain and cervical stenosis, managed with epidural injections.  - Physical exam findings include no new abnormalities.  - Discussed the importance of avoiding heavy lifting and using a grabber to minimize bending over.  - Diclofenac is recommended for arthritis pain management.    3. Headache.  - Reports a headache today, possibly related to being outside in the heat.  - Physical exam findings include a slight wheeze on one side.  - Advised to stay hydrated and monitor symptoms.  - No new medications prescribed for headache management.    4. Elevated temperature.  - Had a slightly elevated temperature today, with readings  of 99.3 and 99.1 degrees Fahrenheit.  - No additional physical exam findings related to fever.  - Advised to monitor temperature and stay hydrated.  - No new medications prescribed for fever management.       I spent 35 minutes caring for Sonia on this date of service. This time includes time spent by me in the following activities:reviewing tests  Follow Up    Return in about 3 months (around 10/8/2025).  Patient was given instructions and counseling regarding her condition or for health maintenance advice. Please see specific information pulled into the AVS if appropriate.   Patient or patient representative verbalized consent for the use of Ambient Listening during the visit with  Hamida Urbina MD for chart documentation. 7/28/2025  13:59 EDT    Hamida Urbina MD      Answers submitted by the patient for this visit:  Chronic Condition Follow-up (Submitted on 7/6/2025)  Chief Complaint: PCP follow-up  other: Yes  Medication compliance: most of the time  Treatment barriers: no complaince problems  Exercise: three times a week

## (undated) DEVICE — SOL IRR NACL 0.9PCT BT 1000ML

## (undated) DEVICE — CONN JET HYDRA H20 AUXILIARY DISP

## (undated) DEVICE — GOWN,REINFRCE,POLY,SIRUS,BREATH SLV,XXLG: Brand: MEDLINE

## (undated) DEVICE — LINER SURG CANSTR SXN S/RIGD 1500CC

## (undated) DEVICE — Device: Brand: DEFENDO AIR/WATER/SUCTION AND BIOPSY VALVE

## (undated) DEVICE — SOL IRRG H2O PL/BG 1000ML STRL

## (undated) DEVICE — Device

## (undated) DEVICE — GLV SURG SENSICARE PI ORTHO SZ8 LF STRL

## (undated) DEVICE — STERILE POLYISOPRENE POWDER-FREE SURGICAL GLOVES WITH EMOLLIENT COATING: Brand: PROTEXIS

## (undated) DEVICE — SOLIDIFIER LIQLOC PLS 1500CC BT